# Patient Record
Sex: FEMALE | Race: WHITE | NOT HISPANIC OR LATINO | Employment: FULL TIME | URBAN - METROPOLITAN AREA
[De-identification: names, ages, dates, MRNs, and addresses within clinical notes are randomized per-mention and may not be internally consistent; named-entity substitution may affect disease eponyms.]

---

## 2017-02-10 ENCOUNTER — DOCUMENTATION (OUTPATIENT)
Dept: GENETICS | Facility: HOSPITAL | Age: 42
End: 2017-02-10

## 2017-02-10 NOTE — PROGRESS NOTES
Ellie Hardy, a 41-year-old female, was seen for genetic counseling due to her family history of Charcot Lexy Tooth type I.  Ms. Hardy’s mother has been diagnosed with CMT1A and was found to have a duplication of the PMP22 gene, causative for hereditary CMT1A. Genetic testing results confirming this mutation were available for our review. Ms. Hardy was interested in pursuing testing of the PMP22 gene to assess her risk for CMT1A.  Ms. Hardy’s testing was negative, indicating that she did not inherit the duplication of the PMP22 gene and, therefore, is not at risk to develop CMT1A. These results were discussed with Ms. Hardy via telephone on1/6/17.    FAMILY HISTORY: (See attached pedigree) Ms. Hardy reports that she started developing neuropathy in her arms around 25 years of age.  One of Ms. Hardy’s brothers has been diagnosed with CMT1A and a maternal aunt and uncle have had neuropathy but do not have a clinical diagnosis of CMT1A. Ms. Hardy’s mother has had neuropathy since childhood and was diagnosed with CMT1A in 1995.  Ms. Hadry’s mother previously reported that her brother, mother, and maternal grandfather all had CMT1A.    We have records confirming Ms. 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 We do not have records on the other family members’ diagnoses.    GENETIC COUNSELING: Charcot Lexy Tooth type 1 (CMT1) is a demyelinating peripheral neuropathy characterized by distal muscle weakness and atrophy, sensory loss, and slow nerve conduction velocity. It is usually slowly progressive and often associated with pes cavus deformity and bilateral foot drop. Affected individuals usually become symptomatic between 5 and 25 years of age. Fewer than 5% of individuals become wheelchair dependent and life span is not shortened.    There are several genes that are associated with CMT. The most common cause of CMT1A is mutations in the  PMP22 gene. A mutation in this specific gene can cause CMT. The clinical sensitivity of the genetic testing depends on the patient’s clinical phenotype. Individuals with CMT have a 50% chance of passing a mutation on to offspring. Based on the family history and Ms. Hardy’s mother’s history of a PMP22 duplication, there was a 50% chance for Ms. Hardy to have inherited the PMP22 duplication, causative for hereditary CMT1A.    GENETIC TESTING: Ms. Hardy’s genetic testing was normal, indicating that she did not inherit the PMP22 duplication from her mother. Based on this test result, Ms. Hardy is not at risk to develop CMT1A and her children are also not at risk for this disorder. Genetic testing for the PMP22 duplication remains available for Ms. Hardy’s relatives.    PLAN:  Ms. Hardy’s genetic testing was negative, indicating that she is not at risk for CMT1A. Ms. Hardy reports that she has had some neuropathy, however it is not due to CMT1A and other causes for her neuropathy should be explored. Genetic counseling remains available to Ms. Hardy and her relatives. We encourage her to contact us with any questions or concerns.      Valarie Varela MS, [facility]  Certified Genetic Counselor         Cc: Ellie Mancia MD

## 2017-03-07 ENCOUNTER — OFFICE VISIT (OUTPATIENT)
Dept: CARDIOLOGY | Facility: CLINIC | Age: 42
End: 2017-03-07

## 2017-03-07 VITALS
DIASTOLIC BLOOD PRESSURE: 103 MMHG | OXYGEN SATURATION: 98 % | BODY MASS INDEX: 38.19 KG/M2 | SYSTOLIC BLOOD PRESSURE: 151 MMHG | HEIGHT: 65 IN | HEART RATE: 81 BPM | WEIGHT: 229.2 LBS

## 2017-03-07 DIAGNOSIS — R53.81 MALAISE AND FATIGUE: ICD-10-CM

## 2017-03-07 DIAGNOSIS — Z00.00 HEALTHCARE MAINTENANCE: ICD-10-CM

## 2017-03-07 DIAGNOSIS — R07.2 PRECORDIAL PAIN: Primary | ICD-10-CM

## 2017-03-07 DIAGNOSIS — R53.83 MALAISE AND FATIGUE: ICD-10-CM

## 2017-03-07 DIAGNOSIS — I10 ESSENTIAL HYPERTENSION: ICD-10-CM

## 2017-03-07 DIAGNOSIS — R06.02 SHORTNESS OF BREATH: ICD-10-CM

## 2017-03-07 DIAGNOSIS — R00.2 PALPITATIONS: ICD-10-CM

## 2017-03-07 DIAGNOSIS — R06.83 SNORES: ICD-10-CM

## 2017-03-07 PROCEDURE — 99204 OFFICE O/P NEW MOD 45 MIN: CPT | Performed by: NURSE PRACTITIONER

## 2017-03-07 RX ORDER — NITROGLYCERIN 0.4 MG/1
TABLET SUBLINGUAL
Qty: 30 TABLET | Refills: 5 | Status: SHIPPED | OUTPATIENT
Start: 2017-03-07 | End: 2021-12-16 | Stop reason: SDUPTHER

## 2017-03-07 RX ORDER — CLONIDINE HYDROCHLORIDE 0.1 MG/1
0.1 TABLET ORAL 3 TIMES DAILY PRN
Qty: 30 TABLET | Refills: 5 | Status: SHIPPED | OUTPATIENT
Start: 2017-03-07 | End: 2018-05-18 | Stop reason: SDUPTHER

## 2017-03-07 RX ORDER — CLONIDINE HYDROCHLORIDE 0.1 MG/1
0.1 TABLET ORAL ONCE
Status: COMPLETED | OUTPATIENT
Start: 2017-03-07 | End: 2017-03-07

## 2017-03-07 RX ORDER — HYDROCHLOROTHIAZIDE 12.5 MG/1
12.5 CAPSULE, GELATIN COATED ORAL DAILY
Qty: 30 CAPSULE | Refills: 11 | Status: SHIPPED | OUTPATIENT
Start: 2017-03-07 | End: 2017-08-31 | Stop reason: SINTOL

## 2017-03-07 RX ADMIN — CLONIDINE HYDROCHLORIDE 0.1 MG: 0.1 TABLET ORAL at 09:40

## 2017-03-07 NOTE — PROGRESS NOTES
Subjective   Ellie Hardy is a 41 y.o. female     Chief Complaint   Patient presents with   • Chest Pain     presents as a new patient   • Shortness of Breath       HPI    Problem List:    1. Hypertension  2. Chest Pain  3. Shortness of breath  4. Palpitations   5. Fatigue    Patient is a 41-year-old female who presents today to establish care with her  at her side.  She says she had been of her usual health until about 3 1/2 weeks ago.  She says that she was sleeping and woke up feeling like she needed to pass out.  She made it to the bathroom and felt sick to her stomach.  She says about 2 weeks later she had another episode same thing.  She woke up feeling like she was going to pass out.  Made it to the bathroom, had N/V, severe mid abdomen pain.  She says she went about her day when it subsided, but then had midsternum chest pressure that went in between her shoulder blades and up in the right side of her neck.  She was diaphoretic, short of breath and lightheaded.  She says the chest pressure has slowly gotten better, but she still has it.  She also says that she has been really fatigued since this has happened as well.  She say she has been having what feels like her heart pauses for about 1 mos now.  She denies any dizziness, presyncope or syncope.  She denies any orthopnea or PND.  Since she had her episodes she has had some BLE edema.  She has been short of breath with any activity for the past 2-3 mos.  Her  says she does snore and is fatigued during the day.     Occupation:   Denies smoking, ETOH and illicit drugs  Caffeine cut down to 2 cups of 1/2 and 1/2 coffee/day    Mom 73 alive - no heart problems  Dad 73  - MI x 2, stents and angioplasty; thoracic aorta aneurysm; AAA (cause of death)  Sister 50s alive - HTN  Brother 49 alive - HTN  Paternal grandfather  - heart disease  Paternal uncles several with heart disease   Daughter 22 alive - postural  hypotension  Daughter 24 alive - PPM for 3 year due to Autonomic Dysfunction     Current Outpatient Prescriptions   Medication Sig Dispense Refill   • aspirin 81 MG tablet Take 1 tablet by mouth Daily. 30 tablet 11   • CloNIDine (CATAPRES) 0.1 MG tablet Take 1 tablet by mouth 3 (Three) Times a Day As Needed for high blood pressure (SBP > 160 or DBP > 90). 30 tablet 5   • hydrochlorothiazide (MICROZIDE) 12.5 MG capsule Take 1 capsule by mouth Daily. 30 capsule 11   • nitroglycerin (NITROSTAT) 0.4 MG SL tablet 1 under the tongue as needed for angina, may repeat q5mins for up three doses 30 tablet 5     No current facility-administered medications for this visit.        ALLERGIES    Review of patient's allergies indicates no known allergies.    Past Medical History   Diagnosis Date   • Hypertension        Social History     Social History   • Marital status:      Spouse name: N/A   • Number of children: N/A   • Years of education: N/A     Occupational History   • Not on file.     Social History Main Topics   • Smoking status: Never Smoker   • Smokeless tobacco: Not on file   • Alcohol use No   • Drug use: No   • Sexual activity: Defer     Other Topics Concern   • Not on file     Social History Narrative   • No narrative on file       Family History   Problem Relation Age of Onset   • Charcot-Lexy-Tooth disease Mother    • Heart disease Father        Review of Systems   Constitutional: Positive for fatigue (since episodes ). Negative for diaphoresis.   HENT: Positive for hearing loss and sore throat. Negative for rhinorrhea and sneezing.    Eyes: Negative for visual disturbance.   Respiratory: Positive for shortness of breath (with activity the lats 2-3 mos really bothersome). Negative for chest tightness.    Cardiovascular: Positive for chest pain (midsternum chest pressure to back with pain in right neck. ), palpitations (pause for about 1 mos) and leg swelling (after 2nd episode ).   Gastrointestinal:  "Positive for constipation, nausea (with 2 episodes ) and vomiting (with 2 episodes ).   Endocrine: Negative.    Genitourinary: Positive for difficulty urinating and hematuria (thinks she passed a kidney stone).   Musculoskeletal: Positive for arthralgias, back pain (with CP between shoulder blades and h/o low back pain), joint swelling, myalgias and neck pain (dull pain in right side of neck ).   Skin: Negative.    Allergic/Immunologic: Positive for environmental allergies.   Neurological: Positive for light-headedness. Negative for dizziness and syncope.   Hematological: Negative.    Psychiatric/Behavioral: The patient is nervous/anxious.        Objective   Visit Vitals   • BP (!) 151/103 (BP Location: Left arm, Patient Position: Sitting)   • Pulse 81   • Ht 65\" (165.1 cm)   • Wt 229 lb 3.2 oz (104 kg)   • SpO2 98%   • BMI 38.14 kg/m2     Lab Results (most recent)     None        Physical Exam   Constitutional: She is oriented to person, place, and time. She appears well-developed and well-nourished. She is active and cooperative.   HENT:   Head: Normocephalic.   Eyes: Lids are normal.   Neck: Normal carotid pulses, no hepatojugular reflux and no JVD present. Carotid bruit is not present.   Cardiovascular: Normal rate, regular rhythm and normal heart sounds.    Pulses:       Radial pulses are 2+ on the right side, and 2+ on the left side.        Dorsalis pedis pulses are 2+ on the right side, and 2+ on the left side.        Posterior tibial pulses are 2+ on the right side, and 2+ on the left side.   No edema BLE.    Pulmonary/Chest: Effort normal and breath sounds normal.   Abdominal: Normal appearance.   Neurological: She is alert and oriented to person, place, and time.   Skin: Skin is warm, dry and intact.   Psychiatric: She has a normal mood and affect. Her speech is normal and behavior is normal. Judgment and thought content normal. Cognition and memory are normal.       Procedure   Procedures   "       Assessment/Plan      Diagnosis Plan   1. Precordial pain  Stress Test With Myocardial Perfusion One Day    Adult Transthoracic Echo Complete    nitroglycerin (NITROSTAT) 0.4 MG SL tablet    aspirin 81 MG tablet    Lipid Panel    D-dimer, Quantitative    Stress Test With Myocardial Perfusion One Day    Adult Transthoracic Echo Complete    Lipid Panel    D-dimer, Quantitative   2. Essential hypertension  CloNIDine (CATAPRES) tablet 0.1 mg    Stress Test With Myocardial Perfusion One Day    Adult Transthoracic Echo Complete    hydrochlorothiazide (MICROZIDE) 12.5 MG capsule    CloNIDine (CATAPRES) 0.1 MG tablet    Stress Test With Myocardial Perfusion One Day    Adult Transthoracic Echo Complete   3. Shortness of breath  Stress Test With Myocardial Perfusion One Day    Adult Transthoracic Echo Complete    Pulse Oximetry Device    D-dimer, Quantitative    Stress Test With Myocardial Perfusion One Day    Adult Transthoracic Echo Complete    D-dimer, Quantitative   4. Palpitations  Stress Test With Myocardial Perfusion One Day    Adult Transthoracic Echo Complete    Cardiac Event Monitor    Stress Test With Myocardial Perfusion One Day    Adult Transthoracic Echo Complete    Cardiac Event Monitor   5. Snores  Pulse Oximetry Device   6. Malaise and fatigue  Pulse Oximetry Device   7. Healthcare maintenance  Lipid Panel    Lipid Panel       Return in about 6 weeks (around 4/18/2017).       Patient will have an ischemia work-up, stress and echo.  She will wear an event monitor for 2 weeks.  She will start HCTZ and ASA and monitor her BP.  She will use clonidine PRN for SBP > 160 or DBP > 90.  She will use nitro PRN for chest pain, no resolution she will go to the ER.  She will get a D-Dimer and lipids.  She will wear an overnight pulse ox.

## 2017-03-07 NOTE — PATIENT INSTRUCTIONS
Hypertension  Hypertension, commonly called high blood pressure, is when the force of blood pumping through your arteries is too strong. Your arteries are the blood vessels that carry blood from your heart throughout your body. A blood pressure reading consists of a higher number over a lower number, such as 110/72. The higher number (systolic) is the pressure inside your arteries when your heart pumps. The lower number (diastolic) is the pressure inside your arteries when your heart relaxes. Ideally you want your blood pressure below 120/80.  Hypertension forces your heart to work harder to pump blood. Your arteries may become narrow or stiff. Having untreated or uncontrolled hypertension can cause heart attack, stroke, kidney disease, and other problems.  RISK FACTORS  Some risk factors for high blood pressure are controllable. Others are not.   Risk factors you cannot control include:   · Race. You may be at higher risk if you are .  · Age. Risk increases with age.  · Gender. Men are at higher risk than women before age 45 years. After age 65, women are at higher risk than men.  Risk factors you can control include:  · Not getting enough exercise or physical activity.  · Being overweight.  · Getting too much fat, sugar, calories, or salt in your diet.  · Drinking too much alcohol.  SIGNS AND SYMPTOMS  Hypertension does not usually cause signs or symptoms. Extremely high blood pressure (hypertensive crisis) may cause headache, anxiety, shortness of breath, and nosebleed.  DIAGNOSIS  To check if you have hypertension, your health care provider will measure your blood pressure while you are seated, with your arm held at the level of your heart. It should be measured at least twice using the same arm. Certain conditions can cause a difference in blood pressure between your right and left arms. A blood pressure reading that is higher than normal on one occasion does not mean that you need treatment. If  it is not clear whether you have high blood pressure, you may be asked to return on a different day to have your blood pressure checked again. Or, you may be asked to monitor your blood pressure at home for 1 or more weeks.  TREATMENT  Treating high blood pressure includes making lifestyle changes and possibly taking medicine. Living a healthy lifestyle can help lower high blood pressure. You may need to change some of your habits.  Lifestyle changes may include:  · Following the DASH diet. This diet is high in fruits, vegetables, and whole grains. It is low in salt, red meat, and added sugars.  · Keep your sodium intake below 2,300 mg per day.  · Getting at least 30-45 minutes of aerobic exercise at least 4 times per week.  · Losing weight if necessary.  · Not smoking.  · Limiting alcoholic beverages.  · Learning ways to reduce stress.  Your health care provider may prescribe medicine if lifestyle changes are not enough to get your blood pressure under control, and if one of the following is true:  · You are 18-59 years of age and your systolic blood pressure is above 140.  · You are 60 years of age or older, and your systolic blood pressure is above 150.  · Your diastolic blood pressure is above 90.  · You have diabetes, and your systolic blood pressure is over 140 or your diastolic blood pressure is over 90.  · You have kidney disease and your blood pressure is above 140/90.  · You have heart disease and your blood pressure is above 140/90.  Your personal target blood pressure may vary depending on your medical conditions, your age, and other factors.  HOME CARE INSTRUCTIONS  · Have your blood pressure rechecked as directed by your health care provider.    · Take medicines only as directed by your health care provider. Follow the directions carefully. Blood pressure medicines must be taken as prescribed. The medicine does not work as well when you skip doses. Skipping doses also puts you at risk for  problems.  · Do not smoke.    · Monitor your blood pressure at home as directed by your health care provider.   SEEK MEDICAL CARE IF:   · You think you are having a reaction to medicines taken.  · You have recurrent headaches or feel dizzy.  · You have swelling in your ankles.  · You have trouble with your vision.  SEEK IMMEDIATE MEDICAL CARE IF:  · You develop a severe headache or confusion.  · You have unusual weakness, numbness, or feel faint.  · You have severe chest or abdominal pain.  · You vomit repeatedly.  · You have trouble breathing.  MAKE SURE YOU:   · Understand these instructions.  · Will watch your condition.  · Will get help right away if you are not doing well or get worse.     This information is not intended to replace advice given to you by your health care provider. Make sure you discuss any questions you have with your health care provider.     Document Released: 12/18/2006 Document Revised: 05/03/2016 Document Reviewed: 10/10/2014  [employer] Interactive Patient Education ©2016 [employer]  Palpitations  A palpitation is the feeling that your heartbeat is irregular or is faster than normal. It may feel like your heart is fluttering or skipping a beat. Palpitations are usually not a serious problem. However, in some cases, you may need further medical evaluation.  CAUSES   Palpitations can be caused by:  · Smoking.  · Caffeine or other stimulants, such as diet pills or energy drinks.  · Alcohol.  · Stress and anxiety.  · Strenuous physical activity.  · Fatigue.  · Certain medicines.  · Heart disease, especially if you have a history of irregular heart rhythms (arrhythmias), such as atrial fibrillation, atrial flutter, or supraventricular tachycardia.  · An improperly working pacemaker or defibrillator.  DIAGNOSIS   To find the cause of your palpitations, your health care provider will take your medical history and perform a physical exam. Your health care provider may also have you take a test  called an ambulatory electrocardiogram (ECG). An ECG records your heartbeat patterns over a 24-hour period. You may also have other tests, such as:  · Transthoracic echocardiogram (TTE). During echocardiography, sound waves are used to evaluate how blood flows through your heart.  · Transesophageal echocardiogram (ELVA).  · Cardiac monitoring. This allows your health care provider to monitor your heart rate and rhythm in real time.  · Holter monitor. This is a portable device that records your heartbeat and can help diagnose heart arrhythmias. It allows your health care provider to track your heart activity for several days, if needed.  · Stress tests by exercise or by giving medicine that makes the heart beat faster.  TREATMENT   Treatment of palpitations depends on the cause of your symptoms and can vary greatly. Most cases of palpitations do not require any treatment other than time, relaxation, and monitoring your symptoms. Other causes, such as atrial fibrillation, atrial flutter, or supraventricular tachycardia, usually require further treatment.  HOME CARE INSTRUCTIONS   · Avoid:    Caffeinated coffee, tea, soft drinks, diet pills, and energy drinks.    Chocolate.    Alcohol.  · Stop smoking if you smoke.  · Reduce your stress and anxiety. Things that can help you relax include:    A method of controlling things in your body, such as your heartbeats, with your mind (biofeedback).    Yoga.    Meditation.    Physical activity such as swimming, jogging, or walking.  · Get plenty of rest and sleep.  SEEK MEDICAL CARE IF:   · You continue to have a fast or irregular heartbeat beyond 24 hours.  · Your palpitations occur more often.  SEEK IMMEDIATE MEDICAL CARE IF:  · You have chest pain or shortness of breath.  · You have a severe headache.  · You feel dizzy or you faint.  MAKE SURE YOU:  · Understand these instructions.  · Will watch your condition.  · Will get help right away if you are not doing well or get  worse.     This information is not intended to replace advice given to you by your health care provider. Make sure you discuss any questions you have with your health care provider.     Document Released: 12/15/2001 Document Revised: 12/23/2014 Document Reviewed: 09/01/2016  [employer] Interactive Patient Education ©2016 [employer]  Edema  Edema is an abnormal buildup of fluids in your body tissues. Edema is somewhat dependent on gravity to pull the fluid to the lowest place in your body. That makes the condition more common in the legs and thighs (lower extremities). Painless swelling of the feet and ankles is common and becomes more likely as you get older. It is also common in looser tissues, like around your eyes.   When the affected area is squeezed, the fluid may move out of that spot and leave a dent for a few moments. This dent is called pitting.   CAUSES   There are many possible causes of edema. Eating too much salt and being on your feet or sitting for a long time can cause edema in your legs and ankles. Hot weather may make edema worse. Common medical causes of edema include:  · Heart failure.  · Liver disease.  · Kidney disease.  · Weak blood vessels in your legs.  · Cancer.  · An injury.  · Pregnancy.  · Some medications.  · Obesity.   SYMPTOMS   Edema is usually painless. Your skin may look swollen or shiny.   DIAGNOSIS   Your health care provider may be able to diagnose edema by asking about your medical history and doing a physical exam. You may need to have tests such as X-rays, an electrocardiogram, or blood tests to check for medical conditions that may cause edema.   TREATMENT   Edema treatment depends on the cause. If you have heart, liver, or kidney disease, you need the treatment appropriate for these conditions. General treatment may include:  · Elevation of the affected body part above the level of your heart.  · Compression of the affected body part. Pressure from elastic bandages or  support stockings squeezes the tissues and forces fluid back into the blood vessels. This keeps fluid from entering the tissues.  · Restriction of fluid and salt intake.  · Use of a water pill (diuretic). These medications are appropriate only for some types of edema. They pull fluid out of your body and make you urinate more often. This gets rid of fluid and reduces swelling, but diuretics can have side effects. Only use diuretics as directed by your health care provider.  HOME CARE INSTRUCTIONS   · Keep the affected body part above the level of your heart when you are lying down.    · Do not sit still or stand for prolonged periods.    · Do not put anything directly under your knees when lying down.  · Do not wear constricting clothing or garters on your upper legs.    · Exercise your legs to work the fluid back into your blood vessels. This may help the swelling go down.    · Wear elastic bandages or support stockings to reduce ankle swelling as directed by your health care provider.    · Eat a low-salt diet to reduce fluid if your health care provider recommends it.    · Only take medicines as directed by your health care provider.   SEEK MEDICAL CARE IF:   · Your edema is not responding to treatment.  · You have heart, liver, or kidney disease and notice symptoms of edema.  · You have edema in your legs that does not improve after elevating them.    · You have sudden and unexplained weight gain.  SEEK IMMEDIATE MEDICAL CARE IF:   · You develop shortness of breath or chest pain.    · You cannot breathe when you lie down.  · You develop pain, redness, or warmth in the swollen areas.    · You have heart, liver, or kidney disease and suddenly get edema.  · You have a fever and your symptoms suddenly get worse.  MAKE SURE YOU:   · Understand these instructions.  · Will watch your condition.  · Will get help right away if you are not doing well or get worse.     This information is not intended to replace advice  given to you by your health care provider. Make sure you discuss any questions you have with your health care provider.     Document Released: 12/18/2006 Document Revised: 01/08/2016 Document Reviewed: 10/10/2014  [employer] Interactive Patient Education ©2016 [employer]  Nonspecific Chest Pain   Chest pain can be caused by many different conditions. There is always a chance that your pain could be related to something serious, such as a heart attack or a blood clot in your lungs. Chest pain can also be caused by conditions that are not life-threatening. If you have chest pain, it is very important to follow up with your health care provider.  CAUSES   Chest pain can be caused by:  · Heartburn.  · Pneumonia or bronchitis.  · Anxiety or stress.  · Inflammation around your heart (pericarditis) or lung (pleuritis or pleurisy).  · A blood clot in your lung.  · A collapsed lung (pneumothorax). It can develop suddenly on its own (spontaneous pneumothorax) or from trauma to the chest.  · Shingles infection (varicella-zoster virus).  · Heart attack.  · Damage to the bones, muscles, and cartilage that make up your chest wall. This can include:    Bruised bones due to injury.    Strained muscles or cartilage due to frequent or repeated coughing or overwork.    Fracture to one or more ribs.    Sore cartilage due to inflammation (costochondritis).  RISK FACTORS   Risk factors for chest pain may include:  · Activities that increase your risk for trauma or injury to your chest.  · Respiratory infections or conditions that cause frequent coughing.  · Medical conditions or overeating that can cause heartburn.  · Heart disease or family history of heart disease.  · Conditions or health behaviors that increase your risk of developing a blood clot.  · Having had chicken pox (varicella zoster).  SIGNS AND SYMPTOMS  Chest pain can feel like:  · Burning or tingling on the surface of your chest or deep in your chest.  · Crushing,  pressure, aching, or squeezing pain.  · Dull or sharp pain that is worse when you move, cough, or take a deep breath.  · Pain that is also felt in your back, neck, shoulder, or arm, or pain that spreads to any of these areas.  Your chest pain may come and go, or it may stay constant.  DIAGNOSIS  Lab tests or other studies may be needed to find the cause of your pain. Your health care provider may have you take a test called an ambulatory ECG (electrocardiogram). An ECG records your heartbeat patterns at the time the test is performed. You may also have other tests, such as:  · Transthoracic echocardiogram (TTE). During echocardiography, sound waves are used to create a picture of all of the heart structures and to look at how blood flows through your heart.  · Transesophageal echocardiogram (ELVA). This is a more advanced imaging test that obtains images from inside your body. It allows your health care provider to see your heart in finer detail.  · Cardiac monitoring. This allows your health care provider to monitor your heart rate and rhythm in real time.  · Holter monitor. This is a portable device that records your heartbeat and can help to diagnose abnormal heartbeats. It allows your health care provider to track your heart activity for several days, if needed.  · Stress tests. These can be done through exercise or by taking medicine that makes your heart beat more quickly.  · Blood tests.  · Imaging tests.  TREATMENT   Your treatment depends on what is causing your chest pain. Treatment may include:  · Medicines. These may include:    Acid blockers for heartburn.    Anti-inflammatory medicine.    Pain medicine for inflammatory conditions.    Antibiotic medicine, if an infection is present.    Medicines to dissolve blood clots.    Medicines to treat coronary artery disease.  · Supportive care for conditions that do not require medicines. This may include:    Resting.    Applying heat or cold packs to injured  areas.    Limiting activities until pain decreases.  HOME CARE INSTRUCTIONS  · If you were prescribed an antibiotic medicine, finish it all even if you start to feel better.  · Avoid any activities that bring on chest pain.  · Do not use any tobacco products, including cigarettes, chewing tobacco, or electronic cigarettes. If you need help quitting, ask your health care provider.  · Do not drink alcohol.  · Take medicines only as directed by your health care provider.  · Keep all follow-up visits as directed by your health care provider. This is important. This includes any further testing if your chest pain does not go away.  · If heartburn is the cause for your chest pain, you may be told to keep your head raised (elevated) while sleeping. This reduces the chance that acid will go from your stomach into your esophagus.  · Make lifestyle changes as directed by your health care provider. These may include:    Getting regular exercise. Ask your health care provider to suggest some activities that are safe for you.    Eating a heart-healthy diet. A registered dietitian can help you to learn healthy eating options.    Maintaining a healthy weight.    Managing diabetes, if necessary.    Reducing stress.  SEEK MEDICAL CARE IF:  · Your chest pain does not go away after treatment.  · You have a rash with blisters on your chest.  · You have a fever.  SEEK IMMEDIATE MEDICAL CARE IF:   · Your chest pain is worse.  · You have an increasing cough, or you cough up blood.  · You have severe abdominal pain.  · You have severe weakness.  · You faint.  · You have chills.  · You have sudden, unexplained chest discomfort.  · You have sudden, unexplained discomfort in your arms, back, neck, or jaw.  · You have shortness of breath at any time.  · You suddenly start to sweat, or your skin gets clammy.  · You feel nauseous or you vomit.  · You suddenly feel light-headed or dizzy.  · Your heart begins to beat quickly, or it feels like it  is skipping beats.  These symptoms may represent a serious problem that is an emergency. Do not wait to see if the symptoms will go away. Get medical help right away. Call your local emergency services (911 in the [place] Do not drive yourself to the hospital.     This information is not intended to replace advice given to you by your health care provider. Make sure you discuss any questions you have with your health care provider.     Document Released: 09/27/2006 Document Revised: 01/08/2016 Document Reviewed: 07/24/2015  [employer] Interactive Patient Education ©2016 [employer]

## 2017-03-13 DIAGNOSIS — R79.81 ABNORMAL PULSE OXIMETRY: Primary | ICD-10-CM

## 2017-03-26 ENCOUNTER — OUTSIDE FACILITY SERVICE (OUTPATIENT)
Dept: CARDIOLOGY | Facility: CLINIC | Age: 42
End: 2017-03-26

## 2017-03-26 PROCEDURE — 93272 ECG/REVIEW INTERPRET ONLY: CPT | Performed by: INTERNAL MEDICINE

## 2017-03-28 ENCOUNTER — OUTSIDE FACILITY SERVICE (OUTPATIENT)
Dept: CARDIOLOGY | Facility: CLINIC | Age: 42
End: 2017-03-28

## 2017-03-28 ENCOUNTER — HOSPITAL ENCOUNTER (OUTPATIENT)
Dept: CARDIOLOGY | Facility: HOSPITAL | Age: 42
Discharge: HOME | End: 2017-03-28

## 2017-03-28 LAB
MAXIMAL PREDICTED HEART RATE: 177 BPM
STRESS TARGET HR: 150 BPM

## 2017-03-28 PROCEDURE — 93306 TTE W/DOPPLER COMPLETE: CPT | Performed by: INTERNAL MEDICINE

## 2017-03-28 PROCEDURE — 25010000002 REGADENOSON 0.4 MG/5ML SOLUTION: Performed by: INTERNAL MEDICINE

## 2017-03-28 PROCEDURE — 0 TECHNETIUM SESTAMIBI: Performed by: INTERNAL MEDICINE

## 2017-03-28 PROCEDURE — 93306 TTE W/DOPPLER COMPLETE: CPT

## 2017-03-28 PROCEDURE — A9500 TC99M SESTAMIBI: HCPCS | Performed by: INTERNAL MEDICINE

## 2017-03-28 PROCEDURE — 93017 CV STRESS TEST TRACING ONLY: CPT

## 2017-03-28 PROCEDURE — 78452 HT MUSCLE IMAGE SPECT MULT: CPT | Performed by: INTERNAL MEDICINE

## 2017-03-28 PROCEDURE — 78452 HT MUSCLE IMAGE SPECT MULT: CPT

## 2017-03-28 PROCEDURE — 93018 CV STRESS TEST I&R ONLY: CPT | Performed by: INTERNAL MEDICINE

## 2017-03-28 RX ADMIN — Medication 1 DOSE: at 14:30

## 2017-03-28 RX ADMIN — REGADENOSON 0.4 MG: 0.08 INJECTION, SOLUTION INTRAVENOUS at 14:30

## 2017-04-18 ENCOUNTER — OFFICE VISIT (OUTPATIENT)
Dept: CARDIOLOGY | Facility: CLINIC | Age: 42
End: 2017-04-18

## 2017-04-18 VITALS
OXYGEN SATURATION: 100 % | HEIGHT: 65 IN | WEIGHT: 230.8 LBS | SYSTOLIC BLOOD PRESSURE: 160 MMHG | HEART RATE: 68 BPM | DIASTOLIC BLOOD PRESSURE: 90 MMHG | BODY MASS INDEX: 38.45 KG/M2

## 2017-04-18 DIAGNOSIS — R53.81 MALAISE AND FATIGUE: ICD-10-CM

## 2017-04-18 DIAGNOSIS — R00.2 PALPITATIONS: ICD-10-CM

## 2017-04-18 DIAGNOSIS — R53.83 MALAISE AND FATIGUE: ICD-10-CM

## 2017-04-18 DIAGNOSIS — I10 ESSENTIAL HYPERTENSION: Primary | ICD-10-CM

## 2017-04-18 DIAGNOSIS — R06.02 SHORTNESS OF BREATH: ICD-10-CM

## 2017-04-18 PROCEDURE — 99213 OFFICE O/P EST LOW 20 MIN: CPT | Performed by: NURSE PRACTITIONER

## 2017-04-18 NOTE — PROGRESS NOTES
Subjective   Ellie Hardy is a 41 y.o. female     Chief Complaint   Patient presents with   • Follow-up     presents as a [facility]    Problem List:    1. Hypertension  2. Chest Pain  2.1 Stress Test 3/28/17 - no ischemia; low risk   3. Shortness of breath  3.1 Echo 3/28/17 -  Mild to mod LVH; EF 55-60%; DD I; mild MR  4. Palpitations   4.1 Event Monitor 3/13-3/26/17 - NSR - ST with PVCs/PACs  5. Fatigue    Patient is a 41-year-old female who presents today for a follow-up on test results with her  at her side.  She denies any chest pain, pressure, dizziness, presyncope, syncope, orthopnea, PND or edema.  She says her palpitations had gotten better, but for the past 3-4 days she has noticed them again.  She says her shortness of breath is better, but she still has it with activity.  She did see pulmonary for her abnormal pulse ox.  She is having her sleep study and PFT this week and following up next week.  She says her BP at home is usually -120 and DBP 70-85.     We went over echo, stress, event and labs.     Current Outpatient Prescriptions   Medication Sig Dispense Refill   • aspirin 81 MG tablet Take 1 tablet by mouth Daily. 30 tablet 11   • CloNIDine (CATAPRES) 0.1 MG tablet Take 1 tablet by mouth 3 (Three) Times a Day As Needed for high blood pressure (SBP > 160 or DBP > 90). 30 tablet 5   • hydrochlorothiazide (MICROZIDE) 12.5 MG capsule Take 1 capsule by mouth Daily. 30 capsule 11   • nitroglycerin (NITROSTAT) 0.4 MG SL tablet 1 under the tongue as needed for angina, may repeat q5mins for up three doses 30 tablet 5     No current facility-administered medications for this visit.        ALLERGIES    Review of patient's allergies indicates no known allergies.    Past Medical History:   Diagnosis Date   • Hypertension        Social History     Social History   • Marital status:      Spouse name: N/A   • Number of children: N/A   • Years of education: N/A     Occupational  "History   • Not on file.     Social History Main Topics   • Smoking status: Never Smoker   • Smokeless tobacco: Not on file   • Alcohol use No   • Drug use: No   • Sexual activity: Defer     Other Topics Concern   • Not on file     Social History Narrative       Family History   Problem Relation Age of Onset   • Charcot-Lexy-Tooth disease Mother    • Heart disease Father        Review of Systems   Constitutional: Positive for fatigue. Negative for diaphoresis.   HENT: Negative for rhinorrhea and sneezing.    Eyes: Negative for visual disturbance.   Respiratory: Positive for shortness of breath (better, but still has some shortness of breath with activity ). Negative for chest tightness.    Cardiovascular: Positive for palpitations (subsided the weeks after she saw me last, but the past 3-4 days has noticed it again  ). Negative for chest pain and leg swelling.   Gastrointestinal: Negative for nausea and vomiting.   Endocrine: Negative.    Genitourinary: Negative for difficulty urinating.   Musculoskeletal: Negative for arthralgias, back pain and neck pain.   Skin: Negative.    Allergic/Immunologic: Negative.    Neurological: Positive for headaches (wakes up with headache). Negative for dizziness, syncope and light-headedness.   Hematological: Negative.    Psychiatric/Behavioral: The patient is nervous/anxious.        Objective   /90 (BP Location: Left arm, Patient Position: Sitting)  Pulse 68  Ht 65\" (165.1 cm)  Wt 230 lb 12.8 oz (105 kg)  SpO2 100%  BMI 38.41 kg/m2  Lab Results (most recent)     None        Physical Exam   Constitutional: She is oriented to person, place, and time. Vital signs are normal. She appears well-developed and well-nourished. She is active and cooperative.   HENT:   Head: Normocephalic.   Eyes: Lids are normal.   Neck: Normal carotid pulses, no hepatojugular reflux and no JVD present. Carotid bruit is not present.   Cardiovascular: Normal rate, regular rhythm and normal heart " sounds.    Pulses:       Radial pulses are 2+ on the right side, and 2+ on the left side.        Dorsalis pedis pulses are 2+ on the right side, and 2+ on the left side.        Posterior tibial pulses are 2+ on the right side, and 2+ on the left side.   No edema BLE.    Pulmonary/Chest: Effort normal and breath sounds normal.   Abdominal: Normal appearance.   Neurological: She is alert and oriented to person, place, and time.   Skin: Skin is warm, dry and intact.   Psychiatric: She has a normal mood and affect. Her speech is normal and behavior is normal. Judgment and thought content normal. Cognition and memory are normal.       Procedure   Procedures         Assessment/Plan      Diagnosis Plan   1. Essential hypertension     2. Malaise and fatigue     3. Palpitations     4. Shortness of breath         Return in about 6 weeks (around 5/30/2017).    Patient will keep a record of her BP/HR twice a day and bring to her follow-up in 6 weeks.  She will continue her medication regimen.  We will see if her palpitations improve if she is determined to have KEVYN and uses a device.  She will follow-up in 6 weeks or sooner if any changes.

## 2017-04-18 NOTE — PATIENT INSTRUCTIONS
Sleep Apnea  Sleep apnea is a condition in which breathing pauses or becomes shallow during sleep. Episodes of sleep apnea usually last 10 seconds or longer, and they may occur as many as 20 times an hour. Sleep apnea disrupts your sleep and keeps your body from getting the rest that it needs. This condition can increase your risk of certain health problems, including:  · Heart attack.  · Stroke.  · Obesity.  · Diabetes.  · Heart failure.  · Irregular heartbeat.  There are three kinds of sleep apnea:  · Obstructive sleep apnea. This kind is caused by a blocked or collapsed airway.  · Central sleep apnea. This kind happens when the part of the brain that controls breathing does not send the correct signals to the muscles that control breathing.  · Mixed sleep apnea. This is a combination of obstructive and central sleep apnea.  CAUSES  The most common cause of this condition is a collapsed or blocked airway. An airway can collapse or become blocked if:  · Your throat muscles are abnormally relaxed.  · Your tongue and tonsils are larger than normal.  · You are overweight.  · Your airway is smaller than normal.  RISK FACTORS  This condition is more likely to develop in people who:  · Are overweight.  · Smoke.  · Have a smaller than normal airway.  · Are elderly.  · Are male.  · Drink alcohol.  · Take sedatives or tranquilizers.  · Have a family history of sleep apnea.  SYMPTOMS  Symptoms of this condition include:  · Trouble staying asleep.  · Daytime sleepiness and tiredness.  · Irritability.  · Loud snoring.  · Morning headaches.  · Trouble concentrating.  · Forgetfulness.  · Decreased interest in sex.  · Unexplained sleepiness.  · Mood swings.  · Personality changes.  · Feelings of depression.  · Waking up often during the night to urinate.  · Dry mouth.  · Sore throat.  DIAGNOSIS  This condition may be diagnosed with:  · A medical history.  · A physical exam.  · A series of tests that are done while you are  sleeping (sleep study). These tests are usually done in a sleep lab, but they may also be done at home.  TREATMENT  Treatment for this condition aims to restore normal breathing and to ease symptoms during sleep. It may involve managing health issues that can affect breathing, such as high blood pressure or obesity. Treatment may include:  · Sleeping on your side.  · Using a decongestant if you have nasal congestion.  · Avoiding the use of depressants, including alcohol, sedatives, and narcotics.  · Losing weight if you are overweight.  · Making changes to your diet.  · Quitting smoking.  · Using a device to open your airway while you sleep, such as:    An oral appliance. This is a custom-made mouthpiece that shifts your lower jaw forward.    A continuous positive airway pressure (CPAP) device. This device delivers oxygen to your airway through a mask.    A nasal expiratory positive airway pressure (EPAP) device. This device has valves that you put into each nostril.    A bi-level positive airway pressure (BPAP) device. This device delivers oxygen to your airway through a mask.  · Surgery if other treatments do not work. During surgery, excess tissue is removed to create a wider airway.  It is important to get treatment for sleep apnea. Without treatment, this condition can lead to:  · High blood pressure.  · Coronary artery disease.  · (Men) An inability to achieve or maintain an erection (impotence).  · Reduced thinking abilities.  HOME CARE INSTRUCTIONS  · Make any lifestyle changes that your health care provider recommends.  · Eat a healthy, well-balanced diet.  · Take over-the-counter and prescription medicines only as told by your health care provider.  · Avoid using depressants, including alcohol, sedatives, and narcotics.  · Take steps to lose weight if you are overweight.  · If you were given a device to open your airway while you sleep, use it only as told by your health care provider.  · Do not use any  tobacco products, such as cigarettes, chewing tobacco, and e-cigarettes. If you need help quitting, ask your health care provider.  · Keep all follow-up visits as told by your health care provider. This is important.  SEEK MEDICAL CARE IF:  · The device that you received to open your airway during sleep is uncomfortable or does not seem to be working.  · Your symptoms do not improve.  · Your symptoms get worse.  SEEK IMMEDIATE MEDICAL CARE IF:  · You develop chest pain.  · You develop shortness of breath.  · You develop discomfort in your back, arms, or stomach.  · You have trouble speaking.  · You have weakness on one side of your body.  · You have drooping in your face.  These symptoms may represent a serious problem that is an emergency. Do not wait to see if the symptoms will go away. Get medical help right away. Call your local emergency services (911 in the [place] Do not drive yourself to the hospital.     This information is not intended to replace advice given to you by your health care provider. Make sure you discuss any questions you have with your health care provider.     Document Released: 12/08/2003 Document Revised: 01/13/2017 Document Reviewed: 09/26/2016  [employer] Interactive Patient Education ©2016 [employer]  Premature Atrial Contraction  Premature atrial contractions (PACs) happen when your heart beats before it has had time to fill with blood. Your heart then has to pause until it can fill with blood for the next beat. This causes the next beat to be more forceful. PACs are also called skipped heartbeats because it may feel like your heart stops for a second.   Your heart has four chambers. There are two upper chambers (atria) and two lower chambers (ventricles). All the chambers need to work together to pump blood properly. Electrical signals spread across your heart and make all the chambers beat together. The signal to beat starts in your atria. If the atria fire a bit early, you may have  a PAC.   CAUSES   The cause of a PAC is often unknown. PACs are sometimes caused by heart disease or injury.  RISK FACTORS  PACs are more common in children and older people. Other risk factors that may trigger PACs include:  · Caffeine.  · Stress.  · Fatigue.  · Alcohol.  · Smoking.  · Stimulant drugs. These may be prescription or illegal drugs.  · Heart disease.  SIGNS AND SYMPTOMS  PACs are very common, especially in children and people 50 years and older. PACs do not cause dizziness, shortness of breath, or chest pain. The only symptom of a PAC is the sensation of a skipped or fluttering heartbeat.   DIAGNOSIS   Your health care provider can diagnose PAC based on the description of your symptom. Your health care provider may also:  · Perform a physical exam to listen to your heart. Your heart may sound normal during this exam.  · Perform tests to rule out other conditions. These tests may include an electrical tracing of your heart called electrocardiogram (ECG). You may need to wear a portable ECG machine (Holter monitor) that records your heart for 24 hours or more.  TREATMENT   In most cases, PACs do not need to be treated. If you have frequent PACs that are caused by heart disease, you may be treated for the underlying condition.  HOME CARE INSTRUCTIONS  · Do not use any tobacco products, including cigarettes, chewing tobacco, or electronic cigarettes. If you need help quitting, ask your health care provider.  · Limit alcohol intake to no more than 1 drink per day for nonpregnant women and 2 drinks per day for men. One drink equals 12 ounces of beer, 5 ounces of wine, or 1½ ounces of hard liquor.  · Limit the amount of caffeine you take in.  · Do not use illegal drugs.  · Get at least 8 hours of sleep every night.  · Find healthy ways to manage stress.  · Get regular exercise. Ask your health care provider to suggest some activities that are safe for you.  SEEK MEDICAL CARE IF:  · You feel your heart  skipping beats often (more than once a day).  · Your heart skips beats and you feel dizzy, lightheaded, or very tired.  SEEK IMMEDIATE MEDICAL CARE IF:   · You have chest pain.  · You have trouble breathing.     This information is not intended to replace advice given to you by your health care provider. Make sure you discuss any questions you have with your health care provider.     Document Released: 08/21/2015 Document Revised: 05/03/2016 Document Reviewed: 08/21/2015  [employer] Interactive Patient Education ©2016 [employer]

## 2017-05-31 ENCOUNTER — OFFICE VISIT (OUTPATIENT)
Dept: CARDIOLOGY | Facility: CLINIC | Age: 42
End: 2017-05-31

## 2017-05-31 VITALS
DIASTOLIC BLOOD PRESSURE: 94 MMHG | SYSTOLIC BLOOD PRESSURE: 146 MMHG | WEIGHT: 232.8 LBS | HEIGHT: 65 IN | OXYGEN SATURATION: 98 % | BODY MASS INDEX: 38.79 KG/M2 | HEART RATE: 92 BPM

## 2017-05-31 DIAGNOSIS — R00.2 PALPITATIONS: ICD-10-CM

## 2017-05-31 DIAGNOSIS — I10 ESSENTIAL HYPERTENSION: Primary | ICD-10-CM

## 2017-05-31 DIAGNOSIS — R60.9 PERIPHERAL EDEMA: ICD-10-CM

## 2017-05-31 PROCEDURE — 99213 OFFICE O/P EST LOW 20 MIN: CPT | Performed by: NURSE PRACTITIONER

## 2017-05-31 RX ORDER — FUROSEMIDE 20 MG/1
20 TABLET ORAL DAILY PRN
Qty: 30 TABLET | Refills: 3 | Status: SHIPPED | OUTPATIENT
Start: 2017-05-31 | End: 2017-10-17 | Stop reason: SDUPTHER

## 2017-08-31 ENCOUNTER — OFFICE VISIT (OUTPATIENT)
Dept: CARDIOLOGY | Facility: CLINIC | Age: 42
End: 2017-08-31

## 2017-08-31 VITALS
HEIGHT: 65 IN | WEIGHT: 234.4 LBS | DIASTOLIC BLOOD PRESSURE: 99 MMHG | SYSTOLIC BLOOD PRESSURE: 139 MMHG | HEART RATE: 85 BPM | OXYGEN SATURATION: 97 % | BODY MASS INDEX: 39.05 KG/M2

## 2017-08-31 DIAGNOSIS — I10 ESSENTIAL HYPERTENSION: Primary | ICD-10-CM

## 2017-08-31 DIAGNOSIS — R60.9 PERIPHERAL EDEMA: ICD-10-CM

## 2017-08-31 DIAGNOSIS — I49.3 PVC (PREMATURE VENTRICULAR CONTRACTION): ICD-10-CM

## 2017-08-31 DIAGNOSIS — R00.2 HEART PALPITATIONS: ICD-10-CM

## 2017-08-31 DIAGNOSIS — R06.02 SHORTNESS OF BREATH: ICD-10-CM

## 2017-08-31 PROCEDURE — 93000 ELECTROCARDIOGRAM COMPLETE: CPT | Performed by: NURSE PRACTITIONER

## 2017-08-31 PROCEDURE — 99214 OFFICE O/P EST MOD 30 MIN: CPT | Performed by: NURSE PRACTITIONER

## 2017-10-17 ENCOUNTER — OFFICE VISIT (OUTPATIENT)
Dept: CARDIOLOGY | Facility: CLINIC | Age: 42
End: 2017-10-17

## 2017-10-17 VITALS
HEART RATE: 72 BPM | HEIGHT: 65 IN | DIASTOLIC BLOOD PRESSURE: 103 MMHG | OXYGEN SATURATION: 100 % | BODY MASS INDEX: 39.09 KG/M2 | SYSTOLIC BLOOD PRESSURE: 147 MMHG | WEIGHT: 234.6 LBS

## 2017-10-17 DIAGNOSIS — R60.9 PERIPHERAL EDEMA: ICD-10-CM

## 2017-10-17 DIAGNOSIS — I10 ESSENTIAL HYPERTENSION: Primary | ICD-10-CM

## 2017-10-17 DIAGNOSIS — R00.2 PALPITATIONS: ICD-10-CM

## 2017-10-17 DIAGNOSIS — R06.02 SHORTNESS OF BREATH: ICD-10-CM

## 2017-10-17 PROCEDURE — 99214 OFFICE O/P EST MOD 30 MIN: CPT | Performed by: NURSE PRACTITIONER

## 2017-10-17 RX ORDER — POTASSIUM CHLORIDE 750 MG/1
TABLET, FILM COATED, EXTENDED RELEASE ORAL
Qty: 15 TABLET | Refills: 5 | Status: SHIPPED | OUTPATIENT
Start: 2017-10-17 | End: 2021-12-16 | Stop reason: SDUPTHER

## 2017-10-17 RX ORDER — FUROSEMIDE 20 MG/1
TABLET ORAL
Qty: 30 TABLET | Refills: 5 | Status: SHIPPED | OUTPATIENT
Start: 2017-10-17 | End: 2018-01-31 | Stop reason: SDUPTHER

## 2017-10-17 RX ORDER — DILTIAZEM HYDROCHLORIDE 120 MG/1
120 CAPSULE, EXTENDED RELEASE ORAL DAILY
Qty: 30 CAPSULE | Refills: 11 | Status: SHIPPED | OUTPATIENT
Start: 2017-10-17 | End: 2018-01-31 | Stop reason: SDUPTHER

## 2017-10-17 NOTE — PATIENT INSTRUCTIONS
Edema  Edema is an abnormal buildup of fluids in your body tissues. Edema is somewhat dependent on gravity to pull the fluid to the lowest place in your body. That makes the condition more common in the legs and thighs (lower extremities). Painless swelling of the feet and ankles is common and becomes more likely as you get older. It is also common in looser tissues, like around your eyes.   When the affected area is squeezed, the fluid may move out of that spot and leave a dent for a few moments. This dent is called pitting.   CAUSES   There are many possible causes of edema. Eating too much salt and being on your feet or sitting for a long time can cause edema in your legs and ankles. Hot weather may make edema worse. Common medical causes of edema include:  · Heart failure.  · Liver disease.  · Kidney disease.  · Weak blood vessels in your legs.  · Cancer.  · An injury.  · Pregnancy.  · Some medications.  · Obesity.   SYMPTOMS   Edema is usually painless. Your skin may look swollen or shiny.   DIAGNOSIS   Your health care provider may be able to diagnose edema by asking about your medical history and doing a physical exam. You may need to have tests such as X-rays, an electrocardiogram, or blood tests to check for medical conditions that may cause edema.   TREATMENT   Edema treatment depends on the cause. If you have heart, liver, or kidney disease, you need the treatment appropriate for these conditions. General treatment may include:  · Elevation of the affected body part above the level of your heart.  · Compression of the affected body part. Pressure from elastic bandages or support stockings squeezes the tissues and forces fluid back into the blood vessels. This keeps fluid from entering the tissues.  · Restriction of fluid and salt intake.  · Use of a water pill (diuretic). These medications are appropriate only for some types of edema. They pull fluid out of your body and make you urinate more often. This  gets rid of fluid and reduces swelling, but diuretics can have side effects. Only use diuretics as directed by your health care provider.  HOME CARE INSTRUCTIONS   · Keep the affected body part above the level of your heart when you are lying down.    · Do not sit still or stand for prolonged periods.    · Do not put anything directly under your knees when lying down.  · Do not wear constricting clothing or garters on your upper legs.    · Exercise your legs to work the fluid back into your blood vessels. This may help the swelling go down.    · Wear elastic bandages or support stockings to reduce ankle swelling as directed by your health care provider.    · Eat a low-salt diet to reduce fluid if your health care provider recommends it.    · Only take medicines as directed by your health care provider.   SEEK MEDICAL CARE IF:   · Your edema is not responding to treatment.  · You have heart, liver, or kidney disease and notice symptoms of edema.  · You have edema in your legs that does not improve after elevating them.    · You have sudden and unexplained weight gain.  SEEK IMMEDIATE MEDICAL CARE IF:   · You develop shortness of breath or chest pain.    · You cannot breathe when you lie down.  · You develop pain, redness, or warmth in the swollen areas.    · You have heart, liver, or kidney disease and suddenly get edema.  · You have a fever and your symptoms suddenly get worse.  MAKE SURE YOU:   · Understand these instructions.  · Will watch your condition.  · Will get help right away if you are not doing well or get worse.     This information is not intended to replace advice given to you by your health care provider. Make sure you discuss any questions you have with your health care provider.     Document Released: 12/18/2006 Document Revised: 04/10/2017 Document Reviewed: 10/10/2014  [employer] Interactive Patient Education ©2017 [employer]  Hypertension  Hypertension, commonly called high blood pressure, is  when the force of blood pumping through your arteries is too strong. Your arteries are the blood vessels that carry blood from your heart throughout your body. A blood pressure reading consists of a higher number over a lower number, such as 110/72. The higher number (systolic) is the pressure inside your arteries when your heart pumps. The lower number (diastolic) is the pressure inside your arteries when your heart relaxes. Ideally you want your blood pressure below 120/80.  Hypertension forces your heart to work harder to pump blood. Your arteries may become narrow or stiff. Having untreated or uncontrolled hypertension can cause heart attack, stroke, kidney disease, and other problems.  RISK FACTORS  Some risk factors for high blood pressure are controllable. Others are not.   Risk factors you cannot control include:   · Race. You may be at higher risk if you are .  · Age. Risk increases with age.  · Gender. Men are at higher risk than women before age 45 years. After age 65, women are at higher risk than men.  Risk factors you can control include:  · Not getting enough exercise or physical activity.  · Being overweight.  · Getting too much fat, sugar, calories, or salt in your diet.  · Drinking too much alcohol.  SIGNS AND SYMPTOMS  Hypertension does not usually cause signs or symptoms. Extremely high blood pressure (hypertensive crisis) may cause headache, anxiety, shortness of breath, and nosebleed.  DIAGNOSIS  To check if you have hypertension, your health care provider will measure your blood pressure while you are seated, with your arm held at the level of your heart. It should be measured at least twice using the same arm. Certain conditions can cause a difference in blood pressure between your right and left arms. A blood pressure reading that is higher than normal on one occasion does not mean that you need treatment. If it is not clear whether you have high blood pressure, you may be  asked to return on a different day to have your blood pressure checked again. Or, you may be asked to monitor your blood pressure at home for 1 or more weeks.  TREATMENT  Treating high blood pressure includes making lifestyle changes and possibly taking medicine. Living a healthy lifestyle can help lower high blood pressure. You may need to change some of your habits.  Lifestyle changes may include:  · Following the DASH diet. This diet is high in fruits, vegetables, and whole grains. It is low in salt, red meat, and added sugars.  · Keep your sodium intake below 2,300 mg per day.  · Getting at least 30-45 minutes of aerobic exercise at least 4 times per week.  · Losing weight if necessary.  · Not smoking.  · Limiting alcoholic beverages.  · Learning ways to reduce stress.  Your health care provider may prescribe medicine if lifestyle changes are not enough to get your blood pressure under control, and if one of the following is true:  · You are 18-59 years of age and your systolic blood pressure is above 140.  · You are 60 years of age or older, and your systolic blood pressure is above 150.  · Your diastolic blood pressure is above 90.  · You have diabetes, and your systolic blood pressure is over 140 or your diastolic blood pressure is over 90.  · You have kidney disease and your blood pressure is above 140/90.  · You have heart disease and your blood pressure is above 140/90.  Your personal target blood pressure may vary depending on your medical conditions, your age, and other factors.  HOME CARE INSTRUCTIONS  · Have your blood pressure rechecked as directed by your health care provider.    · Take medicines only as directed by your health care provider. Follow the directions carefully. Blood pressure medicines must be taken as prescribed. The medicine does not work as well when you skip doses. Skipping doses also puts you at risk for problems.  · Do not smoke.    · Monitor your blood pressure at home as  directed by your health care provider.   SEEK MEDICAL CARE IF:   · You think you are having a reaction to medicines taken.  · You have recurrent headaches or feel dizzy.  · You have swelling in your ankles.  · You have trouble with your vision.  SEEK IMMEDIATE MEDICAL CARE IF:  · You develop a severe headache or confusion.  · You have unusual weakness, numbness, or feel faint.  · You have severe chest or abdominal pain.  · You vomit repeatedly.  · You have trouble breathing.  MAKE SURE YOU:   · Understand these instructions.  · Will watch your condition.  · Will get help right away if you are not doing well or get worse.     This information is not intended to replace advice given to you by your health care provider. Make sure you discuss any questions you have with your health care provider.     Document Released: 12/18/2006 Document Revised: 05/03/2016 Document Reviewed: 10/10/2014  [employer] Interactive Patient Education ©2017 [employer]

## 2017-10-17 NOTE — PROGRESS NOTES
Subjective   Ellie Hardy is a 42 y.o. female     Chief Complaint   Patient presents with   • Hypertension     presents as a follow up       HPI    Problem List:    1. Hypertension  2. Chest Pain  2.1 Stress Test 3/28/17 - no ischemia; low risk   3. Shortness of breath  3.1 Echo 3/28/17 -  Mild to mod LVH; EF 55-60%; DD I; mild MR  4. Palpitations   4.1 Event Monitor 3/13-3/26/17 - NSR - ST with PVCs/PACs  5. Fatigue    Patient is a 42-year-old female who presents today for follow-up.  She denies any chest pain or pressure.  She says her palpitations are much better.  She says she will get dizzy with position changes but only at times.  She denies any presyncope, CP, orthopnea or PND.  She says that her swelling has been a little more here lately as well as her blood pressure has been a lot more elevated.  She felt really good when she started diltiazem however again her shortness of breath has increased her lately as well as her blood pressures become more elevated.    Current Outpatient Prescriptions   Medication Sig Dispense Refill   • aspirin 81 MG tablet Take 1 tablet by mouth Daily. 30 tablet 11   • CloNIDine (CATAPRES) 0.1 MG tablet Take 1 tablet by mouth 3 (Three) Times a Day As Needed for high blood pressure (SBP > 160 or DBP > 90). 30 tablet 5   • furosemide (LASIX) 20 MG tablet Take lasix daily; taken 2 tabs of lasix for 2 days then return to normal dose 30 tablet 5   • nitroglycerin (NITROSTAT) 0.4 MG SL tablet 1 under the tongue as needed for angina, may repeat q5mins for up three doses 30 tablet 5   • diltiazem XR (DILACOR XR) 120 MG 24 hr capsule Take 1 capsule by mouth Daily. 30 capsule 11   • potassium chloride (K-DUR) 10 MEQ CR tablet Take 1 tablet when take 2 tablets of lasix only 15 tablet 5     No current facility-administered medications for this visit.        ALLERGIES    Review of patient's allergies indicates no known allergies.    Past Medical History:   Diagnosis Date   • Hypertension   "  • Sleep apnea     c-pap, followed by Dr. Loera       Social History     Social History   • Marital status:      Spouse name: N/A   • Number of children: N/A   • Years of education: N/A     Occupational History   • Not on file.     Social History Main Topics   • Smoking status: Never Smoker   • Smokeless tobacco: Never Used   • Alcohol use No   • Drug use: No   • Sexual activity: Defer     Other Topics Concern   • Not on file     Social History Narrative       Family History   Problem Relation Age of Onset   • Charcot-Lexy-Tooth disease Mother    • Heart disease Father        Review of Systems   Constitutional: Positive for fatigue (much better ). Negative for diaphoresis.   HENT: Negative for rhinorrhea and sneezing.    Eyes: Negative for visual disturbance.   Respiratory: Positive for shortness of breath (had gotten better, but been a little worse the last few days; had been better at first then BP started going back up ). Negative for chest tightness.    Cardiovascular: Positive for palpitations (much better ) and leg swelling (still some - water pill helps ). Negative for chest pain.   Gastrointestinal: Negative for nausea and vomiting.   Endocrine: Negative.    Genitourinary: Negative for difficulty urinating.   Musculoskeletal: Positive for arthralgias, back pain and neck pain.   Skin: Negative.    Allergic/Immunologic: Positive for environmental allergies.   Neurological: Positive for dizziness (little bit with position changes ). Negative for syncope and light-headedness.   Hematological: Bruises/bleeds easily (bruises easily).   Psychiatric/Behavioral: Negative.        Objective   BP (!) 147/103 (BP Location: Left arm, Patient Position: Sitting)  Pulse 72  Ht 65\" (165.1 cm)  Wt 234 lb 9.6 oz (106 kg)  SpO2 100%  BMI 39.04 kg/m2  Vitals:    10/17/17 1537   BP: (!) 147/103   BP Location: Left arm   Patient Position: Sitting   Pulse: 72   SpO2: 100%   Weight: 234 lb 9.6 oz (106 kg)   Height: 65\" " (165.1 cm)      Lab Results (most recent)     None        Physical Exam   Constitutional: She is oriented to person, place, and time. She appears well-developed and well-nourished. She is active and cooperative.   HENT:   Head: Normocephalic.   Eyes: Lids are normal.   Neck: Normal carotid pulses, no hepatojugular reflux and no JVD present. Carotid bruit is not present.   Cardiovascular: Normal rate, regular rhythm and normal heart sounds.    Pulses:       Radial pulses are 2+ on the right side, and 2+ on the left side.        Dorsalis pedis pulses are 2+ on the right side, and 2+ on the left side.        Posterior tibial pulses are 2+ on the right side, and 2+ on the left side.   1+ edema BLE.    Pulmonary/Chest: Effort normal and breath sounds normal.   Abdominal: Normal appearance and bowel sounds are normal.   Neurological: She is alert and oriented to person, place, and time.   Skin: Skin is warm, dry and intact.   Psychiatric: She has a normal mood and affect. Her speech is normal and behavior is normal. Judgment and thought content normal. Cognition and memory are normal.       Procedure   Procedures         Assessment/Plan      Diagnosis Plan   1. Essential hypertension  diltiazem XR (DILACOR XR) 120 MG 24 hr capsule   2. Palpitations  diltiazem XR (DILACOR XR) 120 MG 24 hr capsule   3. Shortness of breath     4. Peripheral edema  furosemide (LASIX) 20 MG tablet    potassium chloride (K-DUR) 10 MEQ CR tablet       Return in about 3 months (around 1/17/2018).       hypertension/palpitations-patient will start diltiazem 30 twice a day and start diltiazem 120 once a day.  Shortness of breath/peripheral edema-patient will take 40 mg of Lasix for 2 days with 10 mEq of potassium.  After those today she will then change her diltiazem dosing.  She will monitor her blood pressure and heart rate.  She will call in a week and let me know how she is tolerating medication changes.  She will follow-up in 3 months or  sooner if any changes.

## 2018-01-17 ENCOUNTER — OFFICE VISIT (OUTPATIENT)
Dept: CARDIOLOGY | Facility: CLINIC | Age: 43
End: 2018-01-17

## 2018-01-17 VITALS
DIASTOLIC BLOOD PRESSURE: 95 MMHG | HEIGHT: 65 IN | OXYGEN SATURATION: 98 % | HEART RATE: 84 BPM | SYSTOLIC BLOOD PRESSURE: 151 MMHG | BODY MASS INDEX: 39.45 KG/M2 | WEIGHT: 236.8 LBS

## 2018-01-17 DIAGNOSIS — R00.2 PALPITATIONS: ICD-10-CM

## 2018-01-17 DIAGNOSIS — R06.02 SHORTNESS OF BREATH: ICD-10-CM

## 2018-01-17 DIAGNOSIS — R07.9 CHEST PAIN, UNSPECIFIED TYPE: Primary | ICD-10-CM

## 2018-01-17 PROCEDURE — 99214 OFFICE O/P EST MOD 30 MIN: CPT | Performed by: PHYSICIAN ASSISTANT

## 2018-01-17 RX ORDER — ISOSORBIDE MONONITRATE 30 MG/1
30 TABLET, EXTENDED RELEASE ORAL EVERY MORNING
Qty: 30 TABLET | Refills: 11 | Status: SHIPPED | OUTPATIENT
Start: 2018-01-17 | End: 2018-03-15 | Stop reason: ALTCHOICE

## 2018-01-17 NOTE — PROGRESS NOTES
Problem list     Subjective   Ellie Hardy is a 42 y.o. female     Chief Complaint   Patient presents with   • Hypertension     Here for 3 mo. f/u   • Palpitations       HPI    Problem List:     1. Hypertension  2. Chest Pain  2.1 Stress Test 3/28/17 - no ischemia; low risk   3. Shortness of breath  3.1 Echo 3/28/17 -  Mild to mod LVH; EF 55-60%; DD I; mild MR  4. Palpitations   4.1 Event Monitor 3/13-3/26/17 - NSR - ST with PVCs/PACs  5. Fatigue     Patient is a 42-year-old female that presents back for follow-up.  Patient had done better since last office visit in regards to edema and palpitations.  Diltiazem is controlling palpitations and Lasix therapy's help in regards to lower extremity edema.    Patient has been experiencing chest pain unfortunately.  She describes this occurring within the last 2 weeks.  It is not precipitated by activity but rather experienced at random.  She will develop pressure heaviness which is associated with left arm pain as well.  He will last for several minutes before subsiding.  She has been concerned because of the recent start of chest pain.    Shortness of breath is mild at baseline but nothing that has been progressive.  Denies PND orthopnea.  Her palpitations have improved as mentioned above.  No dizziness presyncope or syncope.      Outpatient Encounter Prescriptions as of 1/17/2018   Medication Sig Dispense Refill   • aspirin 81 MG tablet Take 1 tablet by mouth Daily. 30 tablet 11   • diltiazem XR (DILACOR XR) 120 MG 24 hr capsule Take 1 capsule by mouth Daily. 30 capsule 11   • furosemide (LASIX) 20 MG tablet Take lasix daily; taken 2 tabs of lasix for 2 days then return to normal dose 30 tablet 5   • potassium chloride (K-DUR) 10 MEQ CR tablet Take 1 tablet when take 2 tablets of lasix only 15 tablet 5   • CloNIDine (CATAPRES) 0.1 MG tablet Take 1 tablet by mouth 3 (Three) Times a Day As Needed for high blood pressure (SBP > 160 or DBP > 90). 30 tablet 5   •  "isosorbide mononitrate (IMDUR) 30 MG 24 hr tablet Take 1 tablet by mouth Every Morning. 30 tablet 11   • nitroglycerin (NITROSTAT) 0.4 MG SL tablet 1 under the tongue as needed for angina, may repeat q5mins for up three doses 30 tablet 5     No facility-administered encounter medications on file as of 1/17/2018.        Review of patient's allergies indicates no known allergies.    Past Medical History:   Diagnosis Date   • Hypertension    • Sleep apnea     c-pap, followed by Dr. Loera       Social History     Social History   • Marital status:      Spouse name: N/A   • Number of children: N/A   • Years of education: N/A     Occupational History   • Not on file.     Social History Main Topics   • Smoking status: Never Smoker   • Smokeless tobacco: Never Used   • Alcohol use No   • Drug use: No   • Sexual activity: Defer     Other Topics Concern   • Not on file     Social History Narrative       Family History   Problem Relation Age of Onset   • Charcot-Lexy-Tooth disease Mother    • Heart disease Father        Review of Systems   Constitutional: Positive for fatigue.   HENT: Negative.    Eyes: Negative.    Respiratory: Positive for shortness of breath (occas.).    Cardiovascular: Positive for chest pain (radiates through center back between shoulders) and palpitations. Negative for leg swelling.   Gastrointestinal: Negative.    Endocrine: Negative.    Genitourinary: Negative.    Musculoskeletal: Positive for myalgias.   Skin: Negative.    Allergic/Immunologic: Negative.    Neurological: Positive for numbness (left hand, \"band\" around left upper/mid arm).   Hematological: Negative.    Psychiatric/Behavioral: Negative.        Objective   Vitals:    01/17/18 1450   BP: 151/95   BP Location: Left arm   Patient Position: Sitting   Pulse: 84   SpO2: 98%   Weight: 107 kg (236 lb 12.8 oz)   Height: 165.1 cm (65\")      /95 (BP Location: Left arm, Patient Position: Sitting)  Pulse 84  Ht 165.1 cm (65\")  Wt 107 " kg (236 lb 12.8 oz)  SpO2 98%  BMI 39.41 kg/m2    Lab Results (most recent)     None          Physical Exam   Constitutional: She is oriented to person, place, and time. She appears well-developed and well-nourished. No distress.   HENT:   Head: Normocephalic and atraumatic.   Eyes: EOM are normal. Pupils are equal, round, and reactive to light.   Neck: No JVD present.   Cardiovascular: Normal rate, regular rhythm, normal heart sounds and intact distal pulses.  Exam reveals no gallop and no friction rub.    No murmur heard.  Pulmonary/Chest: Effort normal and breath sounds normal. No respiratory distress. She has no wheezes. She has no rales. She exhibits no tenderness.   Musculoskeletal: Normal range of motion. She exhibits no edema.   Neurological: She is alert and oriented to person, place, and time. No cranial nerve deficit.   Skin: Skin is warm and dry. No rash noted. No erythema. No pallor.   Psychiatric: She has a normal mood and affect. Her behavior is normal.   Nursing note and vitals reviewed.      Procedure   Procedures       Assessment/Plan     Problems Addressed this Visit        Cardiovascular and Mediastinum    Palpitations    Relevant Orders    Stress Test With Myocardial Perfusion One Day    Adult Transthoracic Echo Complete W/ Cont if Necessary Per Protocol       Respiratory    Shortness of breath    Relevant Orders    Stress Test With Myocardial Perfusion One Day    Adult Transthoracic Echo Complete W/ Cont if Necessary Per Protocol       Nervous and Auditory    Chest pain - Primary    Relevant Orders    Stress Test With Myocardial Perfusion One Day    Adult Transthoracic Echo Complete W/ Cont if Necessary Per Protocol           recommendation  1.  With new onset chest pain associated with left arm discomfort, I feel ischemia assessment is warranted.  Therefore we'll schedule for Lexiscan stress test.  Echocardiogram to evaluate LV function.  2.  I would like to empirically treat for ischemia by  adding isosorbide.  Upon follow-up, if she continues to have pain, we may have to consider catheterization if stress test is negative as it was previously.  We will await stress test results to direct therapy further.  3.  Otherwise we'll see her back follow-up as scheduled.  Any chest pain not resolved by nitroglycerin, she is to go to the ER.  Follow-up primary as scheduled         Electronically signed by:

## 2018-01-31 DIAGNOSIS — I10 ESSENTIAL HYPERTENSION: ICD-10-CM

## 2018-01-31 DIAGNOSIS — R00.2 PALPITATIONS: ICD-10-CM

## 2018-01-31 DIAGNOSIS — R60.9 PERIPHERAL EDEMA: ICD-10-CM

## 2018-01-31 RX ORDER — FUROSEMIDE 20 MG/1
TABLET ORAL
Qty: 90 TABLET | Refills: 3 | Status: SHIPPED | OUTPATIENT
Start: 2018-01-31 | End: 2019-01-08 | Stop reason: SDUPTHER

## 2018-01-31 RX ORDER — DILTIAZEM HYDROCHLORIDE 120 MG/1
120 CAPSULE, EXTENDED RELEASE ORAL DAILY
Qty: 90 CAPSULE | Refills: 3 | Status: SHIPPED | OUTPATIENT
Start: 2018-01-31 | End: 2018-06-26 | Stop reason: SDUPTHER

## 2018-02-05 ENCOUNTER — HOSPITAL ENCOUNTER (OUTPATIENT)
Dept: CARDIOLOGY | Facility: HOSPITAL | Age: 43
Discharge: HOME | End: 2018-02-05

## 2018-02-05 ENCOUNTER — OUTSIDE FACILITY SERVICE (OUTPATIENT)
Dept: CARDIOLOGY | Facility: CLINIC | Age: 43
End: 2018-02-05

## 2018-02-05 LAB
MAXIMAL PREDICTED HEART RATE: 176 BPM
MAXIMAL PREDICTED HEART RATE: 176 BPM
STRESS TARGET HR: 150 BPM
STRESS TARGET HR: 150 BPM

## 2018-02-05 PROCEDURE — 78452 HT MUSCLE IMAGE SPECT MULT: CPT

## 2018-02-05 PROCEDURE — 0 TECHNETIUM SESTAMIBI: Performed by: INTERNAL MEDICINE

## 2018-02-05 PROCEDURE — A9500 TC99M SESTAMIBI: HCPCS | Performed by: INTERNAL MEDICINE

## 2018-02-05 PROCEDURE — 25010000002 REGADENOSON 0.4 MG/5ML SOLUTION: Performed by: INTERNAL MEDICINE

## 2018-02-05 PROCEDURE — 93306 TTE W/DOPPLER COMPLETE: CPT

## 2018-02-05 PROCEDURE — 93017 CV STRESS TEST TRACING ONLY: CPT

## 2018-02-05 RX ADMIN — TECHNETIUM TC 99M SESTAMIBI 1 DOSE: 1 INJECTION INTRAVENOUS at 08:45

## 2018-02-05 RX ADMIN — REGADENOSON 0.4 MG: 0.08 INJECTION, SOLUTION INTRAVENOUS at 08:45

## 2018-02-06 PROCEDURE — 93018 CV STRESS TEST I&R ONLY: CPT | Performed by: INTERNAL MEDICINE

## 2018-02-06 PROCEDURE — 78452 HT MUSCLE IMAGE SPECT MULT: CPT | Performed by: INTERNAL MEDICINE

## 2018-02-06 PROCEDURE — 93306 TTE W/DOPPLER COMPLETE: CPT | Performed by: INTERNAL MEDICINE

## 2018-02-07 ENCOUNTER — DOCUMENTATION (OUTPATIENT)
Dept: CARDIOLOGY | Facility: CLINIC | Age: 43
End: 2018-02-07

## 2018-02-27 ENCOUNTER — APPOINTMENT (OUTPATIENT)
Dept: WOMENS IMAGING | Facility: HOSPITAL | Age: 43
End: 2018-02-27

## 2018-02-27 PROCEDURE — 77063 BREAST TOMOSYNTHESIS BI: CPT | Performed by: RADIOLOGY

## 2018-02-27 PROCEDURE — 77067 SCR MAMMO BI INCL CAD: CPT | Performed by: RADIOLOGY

## 2018-03-15 ENCOUNTER — OFFICE VISIT (OUTPATIENT)
Dept: CARDIOLOGY | Facility: CLINIC | Age: 43
End: 2018-03-15

## 2018-03-15 VITALS
BODY MASS INDEX: 39.58 KG/M2 | HEIGHT: 65 IN | WEIGHT: 237.6 LBS | HEART RATE: 81 BPM | OXYGEN SATURATION: 98 % | SYSTOLIC BLOOD PRESSURE: 138 MMHG | DIASTOLIC BLOOD PRESSURE: 96 MMHG

## 2018-03-15 DIAGNOSIS — R07.9 CHEST PAIN, UNSPECIFIED TYPE: ICD-10-CM

## 2018-03-15 DIAGNOSIS — J06.9 UPPER RESPIRATORY TRACT INFECTION, UNSPECIFIED TYPE: ICD-10-CM

## 2018-03-15 DIAGNOSIS — R00.2 PALPITATIONS: ICD-10-CM

## 2018-03-15 DIAGNOSIS — I10 ESSENTIAL HYPERTENSION: Primary | ICD-10-CM

## 2018-03-15 DIAGNOSIS — R06.02 SHORTNESS OF BREATH: ICD-10-CM

## 2018-03-15 PROCEDURE — 99213 OFFICE O/P EST LOW 20 MIN: CPT | Performed by: NURSE PRACTITIONER

## 2018-03-15 RX ORDER — AZITHROMYCIN 250 MG/1
TABLET, FILM COATED ORAL
Qty: 6 TABLET | Refills: 0 | Status: SHIPPED | OUTPATIENT
Start: 2018-03-15 | End: 2018-06-26

## 2018-03-15 NOTE — PATIENT INSTRUCTIONS
Obesity, Adult  Obesity is the condition of having too much total body fat. Being overweight or obese means that your weight is greater than what is considered healthy for your body size. Obesity is determined by a measurement called BMI. BMI is an estimate of body fat and is calculated from height and weight. For adults, a BMI of 30 or higher is considered obese.  Obesity can eventually lead to other health concerns and major illnesses, including:  · Stroke.  · Coronary artery disease (CAD).  · Type 2 diabetes.  · Some types of cancer, including cancers of the colon, breast, uterus, and gallbladder.  · Osteoarthritis.  · High blood pressure (hypertension).  · High cholesterol.  · Sleep apnea.  · Gallbladder stones.  · Infertility problems.  What are the causes?  The main cause of obesity is taking in (consuming) more calories than your body uses for energy. Other factors that contribute to this condition may include:  · Being born with genes that make you more likely to become obese.  · Having a medical condition that causes obesity. These conditions include:  ¨ Hypothyroidism.  ¨ Polycystic ovarian syndrome (PCOS).  ¨ Binge-eating disorder.  ¨ Cushing syndrome.  · Taking certain medicines, such as steroids, antidepressants, and seizure medicines.  · Not being physically active (sedentary lifestyle).  · Living where there are limited places to exercise safely or buy healthy foods.  · Not getting enough sleep.  What increases the risk?  The following factors may increase your risk of this condition:  · Having a family history of obesity.  · Being a woman of -American descent.  · Being a man of  descent.  What are the signs or symptoms?  Having excessive body fat is the main symptom of this condition.  How is this diagnosed?  This condition may be diagnosed based on:  · Your symptoms.  · Your medical history.  · A physical exam. Your health care provider may measure:  ¨ Your BMI. If you are an adult  with a BMI between 25 and less than 30, you are considered overweight. If you are an adult with a BMI of 30 or higher, you are considered obese.  ¨ The distances around your hips and your waist (circumferences). These may be compared to each other to help diagnose your condition.  ¨ Your skinfold thickness. Your health care provider may gently pinch a fold of your skin and measure it.  How is this treated?  Treatment for this condition often includes changing your lifestyle. Treatment may include some or all of the following:  · Dietary changes. Work with your health care provider and a dietitian to set a weight-loss goal that is healthy and reasonable for you. Dietary changes may include eating:  ¨ Smaller portions. A portion size is the amount of a particular food that is healthy for you to eat at one time. This varies from person to person.  ¨ Low-calorie or low-fat options.  ¨ More whole grains, fruits, and vegetables.  · Regular physical activity. This may include aerobic activity (cardio) and strength training.  · Medicine to help you lose weight. Your health care provider may prescribe medicine if you are unable to lose 1 pound a week after 6 weeks of eating more healthily and doing more physical activity.  · Surgery. Surgical options may include gastric banding and gastric bypass. Surgery may be done if:  ¨ Other treatments have not helped to improve your condition.  ¨ You have a BMI of 40 or higher.  ¨ You have life-threatening health problems related to obesity.  Follow these instructions at home:     Eating and drinking     · Follow recommendations from your health care provider about what you eat and drink. Your health care provider may advise you to:  ¨ Limit fast foods, sweets, and processed snack foods.  ¨ Choose low-fat options, such as low-fat milk instead of whole milk.  ¨ Eat 5 or more servings of fruits or vegetables every day.  ¨ Eat at home more often. This gives you more control over what you  eat.  ¨ Choose healthy foods when you eat out.  ¨ Learn what a healthy portion size is.  ¨ Keep low-fat snacks on hand.  ¨ Avoid sugary drinks, such as soda, fruit juice, iced tea sweetened with sugar, and flavored milk.  ¨ Eat a healthy breakfast.  · Drink enough water to keep your urine clear or pale yellow.  · Do not go without eating for long periods of time (do not fast) or follow a fad diet. Fasting and fad diets can be unhealthy and even dangerous.  Physical Activity   · Exercise regularly, as told by your health care provider. Ask your health care provider what types of exercise are safe for you and how often you should exercise.  · Warm up and stretch before being active.  · Cool down and stretch after being active.  · Rest between periods of activity.  Lifestyle   · Limit the time that you spend in front of your TV, computer, or video game system.  · Find ways to reward yourself that do not involve food.  · Limit alcohol intake to no more than 1 drink a day for nonpregnant women and 2 drinks a day for men. One drink equals 12 oz of beer, 5 oz of wine, or 1½ oz of hard liquor.  General instructions   · Keep a weight loss journal to keep track of the food you eat and how much you exercise you get.  · Take over-the-counter and prescription medicines only as told by your health care provider.  · Take vitamins and supplements only as told by your health care provider.  · Consider joining a support group. Your health care provider may be able to recommend a support group.  · Keep all follow-up visits as told by your health care provider. This is important.  Contact a health care provider if:  · You are unable to meet your weight loss goal after 6 weeks of dietary and lifestyle changes.  This information is not intended to replace advice given to you by your health care provider. Make sure you discuss any questions you have with your health care provider.  Document Released: 01/25/2006 Document Revised:  05/22/2017 Document Reviewed: 10/05/2016  [employer] Interactive Patient Education © 2017 [employer]  MyPlate from [employer]  The general, healthful diet is based on the 2010 Dietary Guidelines for Americans. The amount of food you need to eat from each food group depends on your age, sex, and level of physical activity and can be individualized by a dietitian. Go to ChooseMyPlate.gov for more information.  What do I need to know about the MyPlate plan?  · Enjoy your food, but eat less.  · Avoid oversized portions.  ¨ ½ of your plate should include fruits and vegetables.  ¨ ¼ of your plate should be grains.  ¨ ¼ of your plate should be protein.  Grains   · Make at least half of your grains whole grains.  · For a 2,000 calorie daily food plan, eat 6 oz every day.  · 1 oz is about 1 slice bread, 1 cup cereal, or ½ cup cooked rice, cereal, or pasta.  Vegetables   · Make half your plate fruits and vegetables.  · For a 2,000 calorie daily food plan, eat 2½ cups every day.  · 1 cup is about 1 cup raw or cooked vegetables or vegetable juice or 2 cups raw leafy greens.  Fruits   · Make half your plate fruits and vegetables.  · For a 2,000 calorie daily food plan, eat 2 cups every day.  · 1 cup is about 1 cup fruit or 100% fruit juice or ½ cup dried fruit.  Protein   · For a 2,000 calorie daily food plan, eat 5½ oz every day.  · 1 oz is about 1 oz meat, poultry, or fish, ¼ cup cooked beans, 1 egg, 1 Tbsp peanut butter, or ½ oz nuts or seeds.  Dairy   · Switch to fat-free or low-fat (1%) milk.  · For a 2,000 calorie daily food plan, eat 3 cups every day.  · 1 cup is about 1 cup milk or yogurt or soy milk (soy beverage), 1½ oz natural cheese, or 2 oz processed cheese.  Fats, Oils, and Empty Calories   · Only small amounts of oils are recommended.  · Empty calories are calories from solid fats or added sugars.  · Compare sodium in foods like soup, bread, and frozen meals. Choose the foods with lower numbers.  · Drink water instead  of sugary drinks.  What foods can I eat?  Grains   Whole grains such as whole wheat, quinoa, millet, and bulgur. Bread, rolls, and pasta made from whole grains. Brown or wild rice. Hot or cold cereals made from whole grains and without added sugar.  Vegetables   All fresh vegetables, especially fresh red, dark green, or orange vegetables. Peas and beans. Low-sodium frozen or canned vegetables prepared without added salt. Low-sodium vegetable juices.  Fruits   All fresh, frozen, and dried fruits. Canned fruit packed in water or fruit juice without added sugar. Fruit juices without added sugar.  Meats and Other Protein Sources   Boiled, baked, or grilled lean meat trimmed of fat. Skinless poultry. Fresh seafood and shellfish. Canned seafood packed in water. Unsalted nuts and unsalted nut butters. Tofu. Dried beans and pea. Eggs.  Dairy   Low-fat or fat-free milk, yogurt, and cheeses.  Sweets and Desserts   Frozen desserts made from low-fat milk.  Fats and Oils   Olive, peanut, and canola oils and margarine. Salad dressing and mayonnaise made from these oils.  Other   Soups and casseroles made from allowed ingredients and without added fat or salt.  The items listed above may not be a complete list of recommended foods or beverages. Contact your dietitian for more options.   What foods are not recommended?  Grains   Sweetened, low-fiber cereals. Packaged baked goods. Snack crackers and chips. Cheese crackers, butter crackers, and biscuits. Frozen waffles, sweet breads, doughnuts, pastries, packaged baking mixes, pancakes, cakes, and cookies.  Vegetables   Regular canned or frozen vegetables or vegetables prepared with salt. Canned tomatoes. Canned tomato sauce. Fried vegetables. Vegetables in cream sauce or cheese sauce.  Fruits   Fruits packed in syrup or made with added sugar.  Meats and Other Protein Sources   Marbled or fatty meats such as ribs. Poultry with skin. Fried meats, poultry, eggs, or fish. Sausages, hot  "dogs, and deli meats such as pastrami, bologna, or salami.  Dairy   Whole milk, cream, cheeses made from whole milk, sour cream. Ice cream or yogurt made from whole milk or with added sugar.  Beverages   For adults, no more than one alcoholic drink per day. Regular soft drinks or other sugary beverages. Juice drinks.  Sweets and Desserts   Sugary or fatty desserts, candy, and other sweets.  Fats and Oils   Solid shortening or partially hydrogenated oils. Solid margarine. Margarine that contains trans fats. Butter.  The items listed above may not be a complete list of foods and beverages to avoid. Contact your dietitian for more information.   This information is not intended to replace advice given to you by your health care provider. Make sure you discuss any questions you have with your health care provider.  Document Released: 01/06/2009 Document Revised: 05/25/2017 Document Reviewed: 11/26/2014  [employer] Interactive Patient Education © 2017 [employer]    Hypertension  Hypertension, commonly called high blood pressure, is when the force of blood pumping through the arteries is too strong. The arteries are the blood vessels that carry blood from the heart throughout the body. Hypertension forces the heart to work harder to pump blood and may cause arteries to become narrow or stiff. Having untreated or uncontrolled hypertension can cause heart attacks, strokes, kidney disease, and other problems.  A blood pressure reading consists of a higher number over a lower number. Ideally, your blood pressure should be below 120/80. The first (\"top\") number is called the systolic pressure. It is a measure of the pressure in your arteries as your heart beats. The second (\"bottom\") number is called the diastolic pressure. It is a measure of the pressure in your arteries as the heart relaxes.  What are the causes?  The cause of this condition is not known.  What increases the risk?  Some risk factors for high blood pressure " are under your control. Others are not.  Factors you can change   · Smoking.  · Having type 2 diabetes mellitus, high cholesterol, or both.  · Not getting enough exercise or physical activity.  · Being overweight.  · Having too much fat, sugar, calories, or salt (sodium) in your diet.  · Drinking too much alcohol.  Factors that are difficult or impossible to change   · Having chronic kidney disease.  · Having a family history of high blood pressure.  · Age. Risk increases with age.  · Race. You may be at higher risk if you are -American.  · Gender. Men are at higher risk than women before age 45. After age 65, women are at higher risk than men.  · Having obstructive sleep apnea.  · Stress.  What are the signs or symptoms?  Extremely high blood pressure (hypertensive crisis) may cause:  · Headache.  · Anxiety.  · Shortness of breath.  · Nosebleed.  · Nausea and vomiting.  · Severe chest pain.  · Jerky movements you cannot control (seizures).  How is this diagnosed?  This condition is diagnosed by measuring your blood pressure while you are seated, with your arm resting on a surface. The cuff of the blood pressure monitor will be placed directly against the skin of your upper arm at the level of your heart. It should be measured at least twice using the same arm. Certain conditions can cause a difference in blood pressure between your right and left arms.  Certain factors can cause blood pressure readings to be lower or higher than normal (elevated) for a short period of time:  · When your blood pressure is higher when you are in a health care provider's office than when you are at home, this is called white coat hypertension. Most people with this condition do not need medicines.  · When your blood pressure is higher at home than when you are in a health care provider's office, this is called masked hypertension. Most people with this condition may need medicines to control blood pressure.  If you have a high  blood pressure reading during one visit or you have normal blood pressure with other risk factors:  · You may be asked to return on a different day to have your blood pressure checked again.  · You may be asked to monitor your blood pressure at home for 1 week or longer.  If you are diagnosed with hypertension, you may have other blood or imaging tests to help your health care provider understand your overall risk for other conditions.  How is this treated?  This condition is treated by making healthy lifestyle changes, such as eating healthy foods, exercising more, and reducing your alcohol intake. Your health care provider may prescribe medicine if lifestyle changes are not enough to get your blood pressure under control, and if:  · Your systolic blood pressure is above 130.  · Your diastolic blood pressure is above 80.  Your personal target blood pressure may vary depending on your medical conditions, your age, and other factors.  Follow these instructions at home:  Eating and drinking   · Eat a diet that is high in fiber and potassium, and low in sodium, added sugar, and fat. An example eating plan is called the DASH (Dietary Approaches to Stop Hypertension) diet. To eat this way:  ¨ Eat plenty of fresh fruits and vegetables. Try to fill half of your plate at each meal with fruits and vegetables.  ¨ Eat whole grains, such as whole wheat pasta, brown rice, or whole grain bread. Fill about one quarter of your plate with whole grains.  ¨ Eat or drink low-fat dairy products, such as skim milk or low-fat yogurt.  ¨ Avoid fatty cuts of meat, processed or cured meats, and poultry with skin. Fill about one quarter of your plate with lean proteins, such as fish, chicken without skin, beans, eggs, and tofu.  ¨ Avoid premade and processed foods. These tend to be higher in sodium, added sugar, and fat.  · Reduce your daily sodium intake. Most people with hypertension should eat less than 1,500 mg of sodium a day.  · Limit  alcohol intake to no more than 1 drink a day for nonpregnant women and 2 drinks a day for men. One drink equals 12 oz of beer, 5 oz of wine, or 1½ oz of hard liquor.  Lifestyle   · Work with your health care provider to maintain a healthy body weight or to lose weight. Ask what an ideal weight is for you.  · Get at least 30 minutes of exercise that causes your heart to beat faster (aerobic exercise) most days of the week. Activities may include walking, swimming, or biking.  · Include exercise to strengthen your muscles (resistance exercise), such as pilates or lifting weights, as part of your weekly exercise routine. Try to do these types of exercises for 30 minutes at least 3 days a week.  · Do not use any products that contain nicotine or tobacco, such as cigarettes and e-cigarettes. If you need help quitting, ask your health care provider.  · Monitor your blood pressure at home as told by your health care provider.  · Keep all follow-up visits as told by your health care provider. This is important.  Medicines   · Take over-the-counter and prescription medicines only as told by your health care provider. Follow directions carefully. Blood pressure medicines must be taken as prescribed.  · Do not skip doses of blood pressure medicine. Doing this puts you at risk for problems and can make the medicine less effective.  · Ask your health care provider about side effects or reactions to medicines that you should watch for.  Contact a health care provider if:  · You think you are having a reaction to a medicine you are taking.  · You have headaches that keep coming back (recurring).  · You feel dizzy.  · You have swelling in your ankles.  · You have trouble with your vision.  Get help right away if:  · You develop a severe headache or confusion.  · You have unusual weakness or numbness.  · You feel faint.  · You have severe pain in your chest or abdomen.  · You vomit repeatedly.  · You have trouble  breathing.  Summary  · Hypertension is when the force of blood pumping through your arteries is too strong. If this condition is not controlled, it may put you at risk for serious complications.  · Your personal target blood pressure may vary depending on your medical conditions, your age, and other factors. For most people, a normal blood pressure is less than 120/80.  · Hypertension is treated with lifestyle changes, medicines, or a combination of both. Lifestyle changes include weight loss, eating a healthy, low-sodium diet, exercising more, and limiting alcohol.  This information is not intended to replace advice given to you by your health care provider. Make sure you discuss any questions you have with your health care provider.  Document Released: 12/18/2006 Document Revised: 11/15/2017 Document Reviewed: 11/15/2017  [employer] Interactive Patient Education © 2017 [employer]

## 2018-03-15 NOTE — PROGRESS NOTES
Subjective   Ellie Hardy is a 42 y.o. female     Chief Complaint   Patient presents with   • Hypertension     presents as a follow up       HPI    Problem List:    1. Hypertension  2. Chest Pain  2.1 Stress Test 3/28/17 - no ischemia; low risk   2.2 Stress Test 2/5/18 - no ischemia; preserved LVEF; inferobasal ischemia   3. Shortness of breath  3.1 Echo 3/28/17 -  Mild to mod LVH; EF 55-60%; DD I; mild MR  3.2 Echo 2/5/18 - mild LVH; septal ridge at the base of septum without MAC or doppler evidence of LVOT obst; EF > 65%; DD I; trace MR  4. Palpitations   4.1 Event Monitor 3/13-3/26/17 - NSR - ST with PVCs/PACs  5. Fatigue    Patient is a 42-year-old female who presents today for follow-up on testing.  She denies any chest pain, pressure, palpitations, fluttering, presyncope, syncope, orthopnea or PND.  She says she has had some swelling in her hands and her face.  She states she did have a little bit of dizziness this past week but she's also had an upper respiratory infection/sinusitis for about the last week and a half.  She says her shortness of breath has been increased because of this as well.    We went over stress, echo and labs.    Current Outpatient Prescriptions   Medication Sig Dispense Refill   • aspirin 81 MG tablet Take 1 tablet by mouth Daily. 30 tablet 11   • CloNIDine (CATAPRES) 0.1 MG tablet Take 1 tablet by mouth 3 (Three) Times a Day As Needed for high blood pressure (SBP > 160 or DBP > 90). 30 tablet 5   • diltiazem XR (DILACOR XR) 120 MG 24 hr capsule Take 1 capsule by mouth Daily. 90 capsule 3   • furosemide (LASIX) 20 MG tablet Take lasix daily; taken 2 tabs of lasix for 2 days then return to normal dose (Patient taking differently: Take 20 mg by mouth Daily.) 90 tablet 3   • nitroglycerin (NITROSTAT) 0.4 MG SL tablet 1 under the tongue as needed for angina, may repeat q5mins for up three doses 30 tablet 5   • potassium chloride (K-DUR) 10 MEQ CR tablet Take 1 tablet when take 2 tablets  of lasix only 15 tablet 5   • azithromycin (ZITHROMAX) 250 MG tablet Take 2 tablets the first day, then 1 tablet daily for 4 days. 6 tablet 0     No current facility-administered medications for this visit.        ALLERGIES    Review of patient's allergies indicates no known allergies.    Past Medical History:   Diagnosis Date   • Hypertension    • Sleep apnea     c-pap, followed by Dr. Loera       Social History     Social History   • Marital status:      Spouse name: N/A   • Number of children: N/A   • Years of education: N/A     Occupational History   • Not on file.     Social History Main Topics   • Smoking status: Never Smoker   • Smokeless tobacco: Never Used   • Alcohol use No   • Drug use: No   • Sexual activity: Defer     Other Topics Concern   • Not on file     Social History Narrative   • No narrative on file       Family History   Problem Relation Age of Onset   • Charcot-Lexy-Tooth disease Mother    • Heart disease Father        Review of Systems   Constitutional: Positive for fatigue.   HENT: Positive for congestion, postnasal drip, sinus pressure and sneezing. Negative for rhinorrhea.    Eyes: Negative for visual disturbance.   Respiratory: Positive for shortness of breath (worse with congestion, has been this way for about 1 1/2 weeks ). Negative for chest tightness.    Cardiovascular: Positive for leg swelling ( hand and face is swelling ). Negative for chest pain and palpitations.   Gastrointestinal: Positive for nausea (due to post nasal drip ). Negative for constipation, diarrhea and vomiting.   Endocrine: Negative.    Genitourinary: Negative for difficulty urinating.   Musculoskeletal: Positive for arthralgias and back pain. Negative for myalgias and neck pain.   Skin: Negative.    Allergic/Immunologic: Positive for environmental allergies.   Neurological: Positive for dizziness (past week, has had some congestion ). Negative for syncope, light-headedness and headaches.   Hematological:  "Does not bruise/bleed easily.   Psychiatric/Behavioral: The patient is nervous/anxious.        Objective   /96 (BP Location: Left arm)   Pulse 81   Ht 165.1 cm (65\")   Wt 108 kg (237 lb 9.6 oz)   SpO2 98%   BMI 39.54 kg/m²   Vitals:    03/15/18 1526 03/15/18 1544   BP: (!) 153/102 138/96   BP Location: Left arm Left arm   Patient Position: Sitting    Pulse: 81    SpO2: 98%    Weight: 108 kg (237 lb 9.6 oz)    Height: 165.1 cm (65\")       Lab Results (most recent)     None        Physical Exam   Constitutional: She is oriented to person, place, and time. She appears well-developed and well-nourished. She is active and cooperative.   HENT:   Head: Normocephalic.   Eyes: Lids are normal.   Neck: Normal carotid pulses, no hepatojugular reflux and no JVD present. Carotid bruit is not present.   Cardiovascular: Normal rate, regular rhythm and normal heart sounds.    Pulses:       Radial pulses are 2+ on the right side, and 2+ on the left side.        Dorsalis pedis pulses are 2+ on the right side, and 2+ on the left side.        Posterior tibial pulses are 2+ on the right side, and 2+ on the left side.   No edema BLE.    Pulmonary/Chest: Effort normal and breath sounds normal.   Abdominal: Normal appearance and bowel sounds are normal.   Neurological: She is alert and oriented to person, place, and time.   Skin: Skin is warm, dry and intact.   Psychiatric: She has a normal mood and affect. Her speech is normal and behavior is normal. Judgment and thought content normal. Cognition and memory are normal.       Procedure   Procedures         Assessment/Plan      Diagnosis Plan   1. Essential hypertension     2. Chest pain, unspecified type     3. Palpitations     4. Upper respiratory tract infection, unspecified type  azithromycin (ZITHROMAX) 250 MG tablet   5. Shortness of breath         Return in about 3 months (around 6/15/2018).       pretension-patient is doing well.  Her blood pressure was up due to the " stress of coming here from work.  Chest pain-this has resolved.  Palpitations-patient is doing very well with the diltiazem and had no more symptoms.  URI-patient will take Zithromax.  She will continue her medication regimen.  She'll follow-up in 3 months or sooner if any changes.  Shortness of breath-a little increased due to URI.      Discussed the patient's BMI with her. BMI is above normal parameters. Follow-up plan includes:  educational material.      Electronically signed by:

## 2018-05-18 DIAGNOSIS — I10 ESSENTIAL HYPERTENSION: ICD-10-CM

## 2018-05-21 RX ORDER — CLONIDINE HYDROCHLORIDE 0.1 MG/1
TABLET ORAL
Qty: 30 TABLET | Refills: 5 | Status: SHIPPED | OUTPATIENT
Start: 2018-05-21

## 2018-06-26 ENCOUNTER — LAB (OUTPATIENT)
Dept: LAB | Facility: HOSPITAL | Age: 43
End: 2018-06-26

## 2018-06-26 ENCOUNTER — OFFICE VISIT (OUTPATIENT)
Dept: CARDIOLOGY | Facility: CLINIC | Age: 43
End: 2018-06-26

## 2018-06-26 VITALS
DIASTOLIC BLOOD PRESSURE: 101 MMHG | SYSTOLIC BLOOD PRESSURE: 155 MMHG | HEART RATE: 92 BPM | WEIGHT: 238.6 LBS | OXYGEN SATURATION: 99 % | HEIGHT: 65 IN | BODY MASS INDEX: 39.75 KG/M2

## 2018-06-26 DIAGNOSIS — Z00.00 HEALTHCARE MAINTENANCE: ICD-10-CM

## 2018-06-26 DIAGNOSIS — R07.2 PRECORDIAL PAIN: ICD-10-CM

## 2018-06-26 DIAGNOSIS — R07.2 PRECORDIAL PAIN: Primary | ICD-10-CM

## 2018-06-26 DIAGNOSIS — R06.02 SHORTNESS OF BREATH: ICD-10-CM

## 2018-06-26 DIAGNOSIS — R00.2 PALPITATIONS: ICD-10-CM

## 2018-06-26 DIAGNOSIS — I10 ESSENTIAL HYPERTENSION: ICD-10-CM

## 2018-06-26 PROCEDURE — 82553 CREATINE MB FRACTION: CPT | Performed by: NURSE PRACTITIONER

## 2018-06-26 PROCEDURE — 36415 COLL VENOUS BLD VENIPUNCTURE: CPT

## 2018-06-26 PROCEDURE — 99214 OFFICE O/P EST MOD 30 MIN: CPT | Performed by: NURSE PRACTITIONER

## 2018-06-26 PROCEDURE — 84484 ASSAY OF TROPONIN QUANT: CPT | Performed by: NURSE PRACTITIONER

## 2018-06-26 PROCEDURE — 93000 ELECTROCARDIOGRAM COMPLETE: CPT | Performed by: NURSE PRACTITIONER

## 2018-06-26 PROCEDURE — 80048 BASIC METABOLIC PNL TOTAL CA: CPT | Performed by: NURSE PRACTITIONER

## 2018-06-26 RX ORDER — DULOXETIN HYDROCHLORIDE 30 MG/1
30 CAPSULE, DELAYED RELEASE ORAL DAILY
COMMUNITY
Start: 2018-05-16 | End: 2020-10-15 | Stop reason: ALTCHOICE

## 2018-06-26 RX ORDER — MELATONIN
1000 DAILY
COMMUNITY

## 2018-06-26 RX ORDER — CLOPIDOGREL BISULFATE 75 MG/1
75 TABLET ORAL DAILY
Qty: 30 TABLET | Refills: 11 | Status: SHIPPED | OUTPATIENT
Start: 2018-06-26 | End: 2018-07-30

## 2018-06-26 RX ORDER — DILTIAZEM HYDROCHLORIDE 180 MG/1
180 CAPSULE, EXTENDED RELEASE ORAL DAILY
Qty: 90 CAPSULE | Refills: 3 | Status: SHIPPED | OUTPATIENT
Start: 2018-06-26 | End: 2018-07-30 | Stop reason: SDUPTHER

## 2018-06-26 NOTE — PROGRESS NOTES
Subjective   Ellie Hardy is a 42 y.o. female     Chief Complaint   Patient presents with   • Hypertension     PRESENTS AS A FOLLOW UP   • Palpitations   • Chest Pain   • Shortness of Breath       HPI    Problem List:    1. Hypertension  2. Chest Pain  2.1 Stress Test 3/28/17 - no ischemia; low risk   2.2 Stress Test 2/5/18 - no ischemia; preserved LVEF; inferobasal akinesis  3. Shortness of breath  3.1 Echo 3/28/17 -  Mild to mod LVH; EF 55-60%; DD I; mild MR  3.2 Echo 2/5/18 - mild LVH; septal ridge at the base of septum without MAC or doppler evidence of LVOT obst; EF > 65%; DD I; trace MR  4. Palpitations   4.1 Event Monitor 3/13-3/26/17 - NSR - ST with PVCs/PACs  5. Fatigue    Patient is a 42-year-old female who presents today for follow-up.  She continues to have midsternum chest heaviness.  She says that it does not radiate and she is not taken any nitroglycerin.  She says that she will get diaphoretic, lightheaded and short of breath when it occurs.  She says it lasted 5-10 minutes.  She says it mostly occurs with activity about 3-4 times a month.  She says if she stops and rest she says symptoms will resolve.  Patient says she hasn't really noticed any more palpitations but she has a quivering sensation in her body.  She says that she will usually get dizzy when she stands for a while or at night when she lays back.  She says it is a spinning sensation.  She denies any presyncope, syncope, orthopnea or PND.  She does get a little bit of swelling in her legs.  She says she does get short of breath if she does more than normal.  She says she's absolutely fatigued all of the time.  He has had to take some clonidine due to elevated blood pressure.  Patient says she has been to rheumatologist as well due to possible autoimmune diseases but when she is retested she was normal so he states that they were probably false positives.  She also says that she had elevated liver enzymes and when this will recheck they  were still elevated.     Current Outpatient Prescriptions   Medication Sig Dispense Refill   • aspirin 81 MG tablet Take 1 tablet by mouth Daily. 30 tablet 11   • cholecalciferol (VITAMIN D3) 1000 units tablet Take 1,000 Units by mouth Daily.     • CloNIDine (CATAPRES) 0.1 MG tablet take 1 tablet by mouth three times a day if needed for HIGH 30 tablet 5   • diltiazem XR (DILACOR XR) 180 MG 24 hr capsule Take 1 capsule by mouth Daily. 90 capsule 3   • DULoxetine (CYMBALTA) 30 MG capsule Take 30 mg by mouth Daily.     • furosemide (LASIX) 20 MG tablet Take lasix daily; taken 2 tabs of lasix for 2 days then return to normal dose (Patient taking differently: Take 20 mg by mouth Daily.) 90 tablet 3   • nitroglycerin (NITROSTAT) 0.4 MG SL tablet 1 under the tongue as needed for angina, may repeat q5mins for up three doses 30 tablet 5   • potassium chloride (K-DUR) 10 MEQ CR tablet Take 1 tablet when take 2 tablets of lasix only 15 tablet 5   • clopidogrel (PLAVIX) 75 MG tablet Take 1 tablet by mouth Daily. 30 tablet 11     No current facility-administered medications for this visit.        ALLERGIES    Patient has no known allergies.    Past Medical History:   Diagnosis Date   • Hypertension    • Sleep apnea     c-pap, followed by Dr. Loera       Social History     Social History   • Marital status:      Spouse name: N/A   • Number of children: N/A   • Years of education: N/A     Occupational History   • Not on file.     Social History Main Topics   • Smoking status: Never Smoker   • Smokeless tobacco: Never Used   • Alcohol use No   • Drug use: No   • Sexual activity: Defer     Other Topics Concern   • Not on file     Social History Narrative   • No narrative on file       Family History   Problem Relation Age of Onset   • Charcot-Lexy-Tooth disease Mother    • Heart disease Father        Review of Systems   Constitutional: Positive for diaphoresis (WITH CP ) and fatigue (ALL OF THE TIME ).   HENT: Negative for  "sneezing.    Eyes: Negative for visual disturbance.   Respiratory: Positive for shortness of breath (IF DO MORE THAN NORMAL; WITH CP ). Negative for chest tightness.    Cardiovascular: Positive for chest pain (HEAVINESS MIDDLE USUALLY; NO RAD; NO NITRO;  RESOLVES WITH REST, LASTS 5-10; OCCURS 3-4 X A MONTH), palpitations ( QUIVER FEELING, FEELS LIKE HANDS SUSY ) and leg swelling (MILDLY ).   Gastrointestinal: Negative for constipation, diarrhea, nausea and vomiting.   Endocrine: Negative.    Genitourinary: Negative for difficulty urinating.   Musculoskeletal: Positive for arthralgias (IMPROVED SINCE CYMBALTA) and myalgias ( RESTLESSNESS ). Negative for back pain and neck pain.   Skin: Negative.    Allergic/Immunologic: Negative for environmental allergies and food allergies.   Neurological: Positive for dizziness (USUALLY WHEN SHE STANDS OR AT NIGHT WHEN SHE LAYS BACK; ROOM SPINNING ) and light-headedness (WITH CP ). Negative for syncope.   Hematological: Does not bruise/bleed easily.   Psychiatric/Behavioral: The patient is nervous/anxious.        Objective   BP (!) 155/101 (BP Location: Left arm, Patient Position: Sitting)   Pulse 92   Ht 165.1 cm (65\")   Wt 108 kg (238 lb 9.6 oz)   SpO2 99%   BMI 39.71 kg/m²   Vitals:    06/26/18 1514   BP: (!) 155/101   BP Location: Left arm   Patient Position: Sitting   Pulse: 92   SpO2: 99%   Weight: 108 kg (238 lb 9.6 oz)   Height: 165.1 cm (65\")      Lab Results (most recent)     None        Physical Exam   Constitutional: She is oriented to person, place, and time. She appears well-developed. She is active and cooperative.   HENT:   Head: Normocephalic.   Eyes: Lids are normal.   Neck: Normal carotid pulses, no hepatojugular reflux and no JVD present. Carotid bruit is not present.   Cardiovascular: Normal rate, regular rhythm and normal heart sounds.    Pulses:       Radial pulses are 2+ on the right side, and 2+ on the left side.        Dorsalis pedis pulses are 2+ " on the right side, and 2+ on the left side.        Posterior tibial pulses are 2+ on the right side, and 2+ on the left side.   Trace edema BLE.    Pulmonary/Chest: Effort normal and breath sounds normal.   Abdominal: Normal appearance and bowel sounds are normal.   Neurological: She is alert and oriented to person, place, and time.   Skin: Skin is warm, dry and intact.   Psychiatric: She has a normal mood and affect. Her speech is normal and behavior is normal. Judgment and thought content normal. Cognition and memory are normal.       Procedure     ECG 12 Lead  Date/Time: 6/26/2018 4:40 PM  Performed by: OLY MAO  Authorized by: OLY MAO   Rhythm: sinus rhythm  Rate: normal  BPM: 82  QRS axis: normal  Clinical impression: normal ECG               Assessment/Plan      Diagnosis Plan   1. Precordial pain  clopidogrel (PLAVIX) 75 MG tablet    Cardiac catheterization    Troponin    CK-MB   2. Essential hypertension  diltiazem XR (DILACOR XR) 180 MG 24 hr capsule    Basic Metabolic Panel    Cardiac catheterization   3. Shortness of breath  Cardiac catheterization    Troponin    CK-MB   4. Palpitations  diltiazem XR (DILACOR XR) 180 MG 24 hr capsule    Cardiac catheterization   5. Healthcare maintenance  Basic Metabolic Panel       Return for After testing.       chest pain/hypertension/shortness of breath/palpitations-patient will have left heart catheter due to continued symptoms.  She will start Plavix.  She will use nitroglycerin when necessary for chest pain no resolution she will go to the ER.  I am increasing her diltiazem to 180.  She will get a BMP, troponin and CK-MB today.  She will continue her medication regimen otherwise.  I am holding off on starting her on a statin due to elevated liver enzymes per patient's report and I will have to get a copy of this were PCP.  She will follow-up after left heart catheter or sooner if any changes.    Negative for orthostatic hypotension.     Patient's  Body mass index is 39.71 kg/m². BMI is above normal parameters. Recommendations include: educational material.      Electronically signed by:

## 2018-06-26 NOTE — PATIENT INSTRUCTIONS
Obesity, Adult  Obesity is the condition of having too much total body fat. Being overweight or obese means that your weight is greater than what is considered healthy for your body size. Obesity is determined by a measurement called BMI. BMI is an estimate of body fat and is calculated from height and weight. For adults, a BMI of 30 or higher is considered obese.  Obesity can eventually lead to other health concerns and major illnesses, including:  · Stroke.  · Coronary artery disease (CAD).  · Type 2 diabetes.  · Some types of cancer, including cancers of the colon, breast, uterus, and gallbladder.  · Osteoarthritis.  · High blood pressure (hypertension).  · High cholesterol.  · Sleep apnea.  · Gallbladder stones.  · Infertility problems.    What are the causes?  The main cause of obesity is taking in (consuming) more calories than your body uses for energy. Other factors that contribute to this condition may include:  · Being born with genes that make you more likely to become obese.  · Having a medical condition that causes obesity. These conditions include:  ? Hypothyroidism.  ? Polycystic ovarian syndrome (PCOS).  ? Binge-eating disorder.  ? Cushing syndrome.  · Taking certain medicines, such as steroids, antidepressants, and seizure medicines.  · Not being physically active (sedentary lifestyle).  · Living where there are limited places to exercise safely or buy healthy foods.  · Not getting enough sleep.    What increases the risk?  The following factors may increase your risk of this condition:  · Having a family history of obesity.  · Being a woman of -American descent.  · Being a man of  descent.    What are the signs or symptoms?  Having excessive body fat is the main symptom of this condition.  How is this diagnosed?  This condition may be diagnosed based on:  · Your symptoms.  · Your medical history.  · A physical exam. Your health care provider may measure:  ? Your BMI. If you are an  adult with a BMI between 25 and less than 30, you are considered overweight. If you are an adult with a BMI of 30 or higher, you are considered obese.  ? The distances around your hips and your waist (circumferences). These may be compared to each other to help diagnose your condition.  ? Your skinfold thickness. Your health care provider may gently pinch a fold of your skin and measure it.    How is this treated?  Treatment for this condition often includes changing your lifestyle. Treatment may include some or all of the following:  · Dietary changes. Work with your health care provider and a dietitian to set a weight-loss goal that is healthy and reasonable for you. Dietary changes may include eating:  ? Smaller portions. A portion size is the amount of a particular food that is healthy for you to eat at one time. This varies from person to person.  ? Low-calorie or low-fat options.  ? More whole grains, fruits, and vegetables.  · Regular physical activity. This may include aerobic activity (cardio) and strength training.  · Medicine to help you lose weight. Your health care provider may prescribe medicine if you are unable to lose 1 pound a week after 6 weeks of eating more healthily and doing more physical activity.  · Surgery. Surgical options may include gastric banding and gastric bypass. Surgery may be done if:  ? Other treatments have not helped to improve your condition.  ? You have a BMI of 40 or higher.  ? You have life-threatening health problems related to obesity.    Follow these instructions at home:    Eating and drinking    · Follow recommendations from your health care provider about what you eat and drink. Your health care provider may advise you to:  ? Limit fast foods, sweets, and processed snack foods.  ? Choose low-fat options, such as low-fat milk instead of whole milk.  ? Eat 5 or more servings of fruits or vegetables every day.  ? Eat at home more often. This gives you more control over  what you eat.  ? Choose healthy foods when you eat out.  ? Learn what a healthy portion size is.  ? Keep low-fat snacks on hand.  ? Avoid sugary drinks, such as soda, fruit juice, iced tea sweetened with sugar, and flavored milk.  ? Eat a healthy breakfast.  · Drink enough water to keep your urine clear or pale yellow.  · Do not go without eating for long periods of time (do not fast) or follow a fad diet. Fasting and fad diets can be unhealthy and even dangerous.  Physical Activity  · Exercise regularly, as told by your health care provider. Ask your health care provider what types of exercise are safe for you and how often you should exercise.  · Warm up and stretch before being active.  · Cool down and stretch after being active.  · Rest between periods of activity.  Lifestyle  · Limit the time that you spend in front of your TV, computer, or video game system.  · Find ways to reward yourself that do not involve food.  · Limit alcohol intake to no more than 1 drink a day for nonpregnant women and 2 drinks a day for men. One drink equals 12 oz of beer, 5 oz of wine, or 1½ oz of hard liquor.  General instructions  · Keep a weight loss journal to keep track of the food you eat and how much you exercise you get.  · Take over-the-counter and prescription medicines only as told by your health care provider.  · Take vitamins and supplements only as told by your health care provider.  · Consider joining a support group. Your health care provider may be able to recommend a support group.  · Keep all follow-up visits as told by your health care provider. This is important.  Contact a health care provider if:  · You are unable to meet your weight loss goal after 6 weeks of dietary and lifestyle changes.  This information is not intended to replace advice given to you by your health care provider. Make sure you discuss any questions you have with your health care provider.  Document Released: 01/25/2006 Document Revised:  05/22/2017 Document Reviewed: 10/05/2016  [employer] Interactive Patient Education © 2018 [employer]  MyPlate from [employer]  The general, healthful diet is based on the 2010 Dietary Guidelines for Americans. The amount of food you need to eat from each food group depends on your age, sex, and level of physical activity and can be individualized by a dietitian. Go to ChooseMyPlate.gov for more information.  What do I need to know about the MyPlate plan?  · Enjoy your food, but eat less.  · Avoid oversized portions.  ? ½ of your plate should include fruits and vegetables.  ? ¼ of your plate should be grains.  ? ¼ of your plate should be protein.  Grains  · Make at least half of your grains whole grains.  · For a 2,000 calorie daily food plan, eat 6 oz every day.  · 1 oz is about 1 slice bread, 1 cup cereal, or ½ cup cooked rice, cereal, or pasta.  Vegetables  · Make half your plate fruits and vegetables.  · For a 2,000 calorie daily food plan, eat 2½ cups every day.  · 1 cup is about 1 cup raw or cooked vegetables or vegetable juice or 2 cups raw leafy greens.  Fruits  · Make half your plate fruits and vegetables.  · For a 2,000 calorie daily food plan, eat 2 cups every day.  · 1 cup is about 1 cup fruit or 100% fruit juice or ½ cup dried fruit.  Protein  · For a 2,000 calorie daily food plan, eat 5½ oz every day.  · 1 oz is about 1 oz meat, poultry, or fish, ¼ cup cooked beans, 1 egg, 1 Tbsp peanut butter, or ½ oz nuts or seeds.  Dairy  · Switch to fat-free or low-fat (1%) milk.  · For a 2,000 calorie daily food plan, eat 3 cups every day.  · 1 cup is about 1 cup milk or yogurt or soy milk (soy beverage), 1½ oz natural cheese, or 2 oz processed cheese.  Fats, Oils, and Empty Calories  · Only small amounts of oils are recommended.  · Empty calories are calories from solid fats or added sugars.  · Compare sodium in foods like soup, bread, and frozen meals. Choose the foods with lower numbers.  · Drink water instead of  sugary drinks.  What foods can I eat?  Grains  Whole grains such as whole wheat, quinoa, millet, and bulgur. Bread, rolls, and pasta made from whole grains. Brown or wild rice. Hot or cold cereals made from whole grains and without added sugar.  Vegetables  All fresh vegetables, especially fresh red, dark green, or orange vegetables. Peas and beans. Low-sodium frozen or canned vegetables prepared without added salt. Low-sodium vegetable juices.  Fruits  All fresh, frozen, and dried fruits. Canned fruit packed in water or fruit juice without added sugar. Fruit juices without added sugar.  Meats and Other Protein Sources  Boiled, baked, or grilled lean meat trimmed of fat. Skinless poultry. Fresh seafood and shellfish. Canned seafood packed in water. Unsalted nuts and unsalted nut butters. Tofu. Dried beans and pea. Eggs.  Dairy  Low-fat or fat-free milk, yogurt, and cheeses.  Sweets and Desserts  Frozen desserts made from low-fat milk.  Fats and Oils  Olive, peanut, and canola oils and margarine. Salad dressing and mayonnaise made from these oils.  Other  Soups and casseroles made from allowed ingredients and without added fat or salt.  The items listed above may not be a complete list of recommended foods or beverages. Contact your dietitian for more options.  What foods are not recommended?  Grains  Sweetened, low-fiber cereals. Packaged baked goods. Snack crackers and chips. Cheese crackers, butter crackers, and biscuits. Frozen waffles, sweet breads, doughnuts, pastries, packaged baking mixes, pancakes, cakes, and cookies.  Vegetables  Regular canned or frozen vegetables or vegetables prepared with salt. Canned tomatoes. Canned tomato sauce. Fried vegetables. Vegetables in cream sauce or cheese sauce.  Fruits  Fruits packed in syrup or made with added sugar.  Meats and Other Protein Sources  Marbled or fatty meats such as ribs. Poultry with skin. Fried meats, poultry, eggs, or fish. Sausages, hot dogs, and deli  meats such as pastrami, bologna, or salami.  Dairy  Whole milk, cream, cheeses made from whole milk, sour cream. Ice cream or yogurt made from whole milk or with added sugar.  Beverages  For adults, no more than one alcoholic drink per day. Regular soft drinks or other sugary beverages. Juice drinks.  Sweets and Desserts  Sugary or fatty desserts, candy, and other sweets.  Fats and Oils  Solid shortening or partially hydrogenated oils. Solid margarine. Margarine that contains trans fats. Butter.  The items listed above may not be a complete list of foods and beverages to avoid. Contact your dietitian for more information.  This information is not intended to replace advice given to you by your health care provider. Make sure you discuss any questions you have with your health care provider.  Document Released: 01/06/2009 Document Revised: 05/25/2017 Document Reviewed: 11/26/2014  [employer] Interactive Patient Education © 2018 [employer]    Coronary Angiogram With Stent  Coronary angiogram with stent placement is a procedure to widen or open a narrow blood vessel of the heart (coronary artery). Arteries may become blocked by cholesterol buildup (plaques) in the lining of the wall. When a coronary artery becomes partially blocked, blood flow to that area decreases. This may lead to chest pain or a heart attack (myocardial infarction).  A stent is a small piece of metal that looks like mesh or a spring. Stent placement may be done as treatment for a heart attack or right after a coronary angiogram in which a blocked artery is found.  Let your health care provider know about:  · Any allergies you have.  · All medicines you are taking, including vitamins, herbs, eye drops, creams, and over-the-counter medicines.  · Any problems you or family members have had with anesthetic medicines.  · Any blood disorders you have.  · Any surgeries you have had.  · Any medical conditions you have.  · Whether you are pregnant or may  be pregnant.  What are the risks?  Generally, this is a safe procedure. However, problems may occur, including:  · Damage to the heart or its blood vessels.  · A return of blockage.  · Bleeding, infection, or bruising at the insertion site.  · A collection of blood under the skin (hematoma) at the insertion site.  · A blood clot in another part of the body.  · Kidney injury.  · Allergic reaction to the dye or contrast that is used.  · Bleeding into the abdomen (retroperitoneal bleeding).    What happens before the procedure?  Staying hydrated  Follow instructions from your health care provider about hydration, which may include:  · Up to 2 hours before the procedure - you may continue to drink clear liquids, such as water, clear fruit juice, black coffee, and plain tea.    Eating and drinking restrictions  Follow instructions from your health care provider about eating and drinking, which may include:  · 8 hours before the procedure - stop eating heavy meals or foods such as meat, fried foods, or fatty foods.  · 6 hours before the procedure - stop eating light meals or foods, such as toast or cereal.  · 2 hours before the procedure - stop drinking clear liquids.    Ask your health care provider about:  · Changing or stopping your regular medicines. This is especially important if you are taking diabetes medicines or blood thinners.  · Taking medicines such as ibuprofen. These medicines can thin your blood. Do not take these medicines before your procedure if your health care provider instructs you not to. Generally, aspirin is recommended before a procedure of passing a small, thin tube (catheter) through a blood vessel and into the heart (cardiac catheterization).    What happens during the procedure?  · An IV tube will be inserted into one of your veins.  · You will be given one or more of the following:  ? A medicine to help you relax (sedative).  ? A medicine to numb the area where the catheter will be inserted  into an artery (local anesthetic).  · To reduce your risk of infection:  ? Your health care team will wash or sanitize their hands.  ? Your skin will be washed with soap.  ? Hair may be removed from the area where the catheter will be inserted.  · Using a guide wire, the catheter will be inserted into an artery. The location may be in your groin, in your wrist, or in the fold of your arm (near your elbow).  · A type of X-ray (fluoroscopy) will be used to help guide the catheter to the opening of the arteries in the heart.  · A dye will be injected into the catheter, and X-rays will be taken. The dye will help to show where any narrowing or blockages are located in the arteries.  · A tiny wire will be guided to the blocked spot, and a balloon will be inflated to make the artery wider.  · The stent will be expanded and will crush the plaques into the wall of the vessel. The stent will hold the area open and improve the blood flow. Most stents have a drug coating to reduce the risk of the stent narrowing over time.  · The artery may be made wider using a drill, laser, or other tools to remove plaques.  · When the blood flow is better, the catheter will be removed. The lining of the artery will grow over the stent, which stays where it was placed.  This procedure may vary among health care providers and hospitals.  What happens after the procedure?  · If the procedure is done through the leg, you will be kept in bed lying flat for about 6 hours. You will be instructed to not bend and not cross your legs.  · The insertion site will be checked frequently.  · The pulse in your foot or wrist will be checked frequently.  · You may have additional blood tests, X-rays, and a test that records the electrical activity of your heart (electrocardiogram, or ECG).  This information is not intended to replace advice given to you by your health care provider. Make sure you discuss any questions you have with your health care  provider.  Document Released: 06/23/2004 Document Revised: 08/17/2017 Document Reviewed: 07/23/2017  [employer] Interactive Patient Education © 2018 [employer]

## 2018-06-27 LAB
ANION GAP SERPL CALCULATED.3IONS-SCNC: 8.3 MMOL/L (ref 3.6–11.2)
BUN BLD-MCNC: 11 MG/DL (ref 7–21)
BUN/CREAT SERPL: 12.4 (ref 7–25)
CALCIUM SPEC-SCNC: 9.6 MG/DL (ref 7.7–10)
CHLORIDE SERPL-SCNC: 103 MMOL/L (ref 99–112)
CK MB SERPL-CCNC: 0.2 NG/ML (ref 0–5)
CO2 SERPL-SCNC: 27.7 MMOL/L (ref 24.3–31.9)
CREAT BLD-MCNC: 0.89 MG/DL (ref 0.43–1.29)
GFR SERPL CREATININE-BSD FRML MDRD: 69 ML/MIN/1.73
GLUCOSE BLD-MCNC: 93 MG/DL (ref 70–110)
OSMOLALITY SERPL CALC.SUM OF ELEC: 276.6 MOSM/KG (ref 273–305)
POTASSIUM BLD-SCNC: 4 MMOL/L (ref 3.5–5.3)
SODIUM BLD-SCNC: 139 MMOL/L (ref 135–153)
TROPONIN I SERPL-MCNC: <0.006 NG/ML

## 2018-06-28 ENCOUNTER — TELEPHONE (OUTPATIENT)
Dept: CARDIOLOGY | Facility: CLINIC | Age: 43
End: 2018-06-28

## 2018-06-28 NOTE — TELEPHONE ENCOUNTER
Patient aware of lab results and to call our office with questions or concerns. [id number]/PAKO       ----- Message from JAXON Nelson sent at 6/27/2018  2:45 PM EDT -----  Please advise patient

## 2018-07-17 ENCOUNTER — TELEPHONE (OUTPATIENT)
Dept: CARDIOLOGY | Facility: CLINIC | Age: 43
End: 2018-07-17

## 2018-07-17 NOTE — TELEPHONE ENCOUNTER
Spoke with patient.  She is allergic to lobster only.  She found out from allergy testing, never eaten it.  She has had CT scans in the past with contrast without any complications.  Discussed with Dr. Newton, no need for pre-med.

## 2018-07-18 ENCOUNTER — OUTSIDE FACILITY SERVICE (OUTPATIENT)
Dept: CARDIOLOGY | Facility: CLINIC | Age: 43
End: 2018-07-18

## 2018-07-18 PROCEDURE — 93458 L HRT ARTERY/VENTRICLE ANGIO: CPT | Performed by: INTERNAL MEDICINE

## 2018-07-30 ENCOUNTER — OFFICE VISIT (OUTPATIENT)
Dept: CARDIOLOGY | Facility: CLINIC | Age: 43
End: 2018-07-30

## 2018-07-30 VITALS
BODY MASS INDEX: 41.01 KG/M2 | HEART RATE: 94 BPM | HEIGHT: 64 IN | OXYGEN SATURATION: 98 % | SYSTOLIC BLOOD PRESSURE: 141 MMHG | WEIGHT: 240.2 LBS | DIASTOLIC BLOOD PRESSURE: 97 MMHG

## 2018-07-30 DIAGNOSIS — G47.33 OSA ON CPAP: ICD-10-CM

## 2018-07-30 DIAGNOSIS — R00.2 PALPITATIONS: ICD-10-CM

## 2018-07-30 DIAGNOSIS — I10 ESSENTIAL HYPERTENSION: Primary | ICD-10-CM

## 2018-07-30 DIAGNOSIS — Z99.89 OSA ON CPAP: ICD-10-CM

## 2018-07-30 DIAGNOSIS — I25.10 CORONARY ARTERY DISEASE INVOLVING NATIVE CORONARY ARTERY OF NATIVE HEART WITHOUT ANGINA PECTORIS: ICD-10-CM

## 2018-07-30 DIAGNOSIS — R06.02 SHORTNESS OF BREATH: ICD-10-CM

## 2018-07-30 PROCEDURE — 99214 OFFICE O/P EST MOD 30 MIN: CPT | Performed by: NURSE PRACTITIONER

## 2018-07-30 RX ORDER — DILTIAZEM HYDROCHLORIDE 240 MG/1
240 CAPSULE, EXTENDED RELEASE ORAL DAILY
Qty: 90 CAPSULE | Refills: 3 | Status: SHIPPED | OUTPATIENT
Start: 2018-07-30 | End: 2018-09-25

## 2018-07-30 NOTE — PATIENT INSTRUCTIONS
"Fat and Cholesterol Restricted Diet  Getting too much fat and cholesterol in your diet may cause health problems. Following this diet helps keep your fat and cholesterol at normal levels. This can keep you from getting sick.  What types of fat should I choose?  · Choose monosaturated and polyunsaturated fats. These are found in foods such as olive oil, canola oil, flaxseeds, walnuts, almonds, and seeds.  · Eat more omega-3 fats. Good choices include salmon, mackerel, sardines, tuna, flaxseed oil, and ground flaxseeds.  · Limit saturated fats. These are in animal products such as meats, butter, and cream. They can also be in plant products such as palm oil, palm kernel oil, and coconut oil.  · Avoid foods with partially hydrogenated oils in them. These contain trans fats. Examples of foods that have trans fats are stick margarine, some tub margarines, cookies, crackers, and other baked goods.  What general guidelines do I need to follow?  · Check food labels. Look for the words \"trans fat\" and \"saturated fat.\"  · When preparing a meal:  ? Fill half of your plate with vegetables and green salads.  ? Fill one fourth of your plate with whole grains. Look for the word \"whole\" as the first word in the ingredient list.  ? Fill one fourth of your plate with lean protein foods.  · Eat more foods that have fiber, like apples, carrots, beans, peas, and barley.  · Eat more home-cooked foods. Eat less at restaurants and buffets.  · Limit or avoid alcohol.  · Limit foods high in starch and sugar.  · Limit fried foods.  · Cook foods without frying them. Baking, boiling, grilling, and broiling are all great options.  · Lose weight if you are overweight. Losing even a small amount of weight can help your overall health. It can also help prevent diseases such as diabetes and heart disease.  What foods can I eat?  Grains  Whole grains, such as whole wheat or whole grain breads, crackers, cereals, and pasta. Unsweetened oatmeal, " bulgur, barley, quinoa, or brown rice. Corn or whole wheat flour tortillas.  Vegetables  Fresh or frozen vegetables (raw, steamed, roasted, or grilled). Green salads.  Fruits  All fresh, canned (in natural juice), or frozen fruits.  Meat and Other Protein Products  Ground beef (85% or leaner), grass-fed beef, or beef trimmed of fat. Skinless chicken or turkey. Ground chicken or turkey. Pork trimmed of fat. All fish and seafood. Eggs. Dried beans, peas, or lentils. Unsalted nuts or seeds. Unsalted canned or dry beans.  Dairy  Low-fat dairy products, such as skim or 1% milk, 2% or reduced-fat cheeses, low-fat ricotta or cottage cheese, or plain low-fat yogurt.  Fats and Oils  Tub margarines without trans fats. Light or reduced-fat mayonnaise and salad dressings. Avocado. Olive, canola, sesame, or safflower oils. Natural peanut or almond butter (choose ones without added sugar and oil).  The items listed above may not be a complete list of recommended foods or beverages. Contact your dietitian for more options.  What foods are not recommended?  Grains  White bread. White pasta. White rice. Cornbread. Bagels, pastries, and croissants. Crackers that contain trans fat.  Vegetables  White potatoes. Corn. Creamed or fried vegetables. Vegetables in a cheese sauce.  Fruits  Dried fruits. Canned fruit in light or heavy syrup. Fruit juice.  Meat and Other Protein Products  Fatty cuts of meat. Ribs, chicken wings, guo, sausage, bologna, salami, chitterlings, fatback, hot dogs, bratwurst, and packaged luncheon meats. Liver and organ meats.  Dairy  Whole or 2% milk, cream, half-and-half, and cream cheese. Whole milk cheeses. Whole-fat or sweetened yogurt. Full-fat cheeses. Nondairy creamers and whipped toppings. Processed cheese, cheese spreads, or cheese curds.  Sweets and Desserts  Corn syrup, sugars, honey, and molasses. Candy. Jam and jelly. Syrup. Sweetened cereals. Cookies, pies, cakes, donuts, muffins, and ice  cream.  Fats and Oils  Butter, stick margarine, lard, shortening, ghee, or guo fat. Coconut, palm kernel, or palm oils.  Beverages  Alcohol. Sweetened drinks (such as sodas, lemonade, and fruit drinks or punches).  The items listed above may not be a complete list of foods and beverages to avoid. Contact your dietitian for more information.  This information is not intended to replace advice given to you by your health care provider. Make sure you discuss any questions you have with your health care provider.  Document Released: 06/18/2013 Document Revised: 08/24/2017 Document Reviewed: 03/19/2015  [employer] Interactive Patient Education © 2018 [employer]  BMI for Adults  Body mass index (BMI) is a number that is calculated from a person's weight and height. In most adults, the number is used to find how much of an adult's weight is made up of fat. BMI is not as accurate as a direct measure of body fat.  How is BMI calculated?  BMI is calculated by dividing weight in kilograms by height in meters squared. It can also be calculated by dividing weight in pounds by height in inches squared, then multiplying the resulting number by 703. Charts are available to help you find your BMI quickly and easily without doing this calculation.  How is BMI interpreted?  Health care professionals use BMI charts to identify whether an adult is underweight, at a normal weight, or overweight based on the following guidelines:  · Underweight: BMI less than 18.5.  · Normal weight: BMI between 18.5 and 24.9.  · Overweight: BMI between 25 and 29.9.  · Obese: BMI of 30 and above.    BMI is usually interpreted the same for males and females.  Weight includes both fat and muscle, so someone with a muscular build, such as an athlete, may have a BMI that is higher than 24.9. In cases like these, BMI may not accurately depict body fat. To determine if excess body fat is the cause of a BMI of 25 or higher, further assessments may need to be  "done by a health care provider.  Why is BMI a useful tool?  BMI is used to identify a possible weight problem that may be related to a medical problem or may increase the risk for medical problems. BMI can also be used to promote changes to reach a healthy weight.  This information is not intended to replace advice given to you by your health care provider. Make sure you discuss any questions you have with your health care provider.  Document Released: 08/29/2005 Document Revised: 04/27/2017 Document Reviewed: 05/15/2015  [employer] Interactive Patient Education © 2018 [employer]    Hypertension  Hypertension, commonly called high blood pressure, is when the force of blood pumping through the arteries is too strong. The arteries are the blood vessels that carry blood from the heart throughout the body. Hypertension forces the heart to work harder to pump blood and may cause arteries to become narrow or stiff. Having untreated or uncontrolled hypertension can cause heart attacks, strokes, kidney disease, and other problems.  A blood pressure reading consists of a higher number over a lower number. Ideally, your blood pressure should be below 120/80. The first (\"top\") number is called the systolic pressure. It is a measure of the pressure in your arteries as your heart beats. The second (\"bottom\") number is called the diastolic pressure. It is a measure of the pressure in your arteries as the heart relaxes.  What are the causes?  The cause of this condition is not known.  What increases the risk?  Some risk factors for high blood pressure are under your control. Others are not.  Factors you can change  · Smoking.  · Having type 2 diabetes mellitus, high cholesterol, or both.  · Not getting enough exercise or physical activity.  · Being overweight.  · Having too much fat, sugar, calories, or salt (sodium) in your diet.  · Drinking too much alcohol.  Factors that are difficult or impossible to change  · Having chronic " kidney disease.  · Having a family history of high blood pressure.  · Age. Risk increases with age.  · Race. You may be at higher risk if you are -American.  · Gender. Men are at higher risk than women before age 45. After age 65, women are at higher risk than men.  · Having obstructive sleep apnea.  · Stress.  What are the signs or symptoms?  Extremely high blood pressure (hypertensive crisis) may cause:  · Headache.  · Anxiety.  · Shortness of breath.  · Nosebleed.  · Nausea and vomiting.  · Severe chest pain.  · Jerky movements you cannot control (seizures).    How is this diagnosed?  This condition is diagnosed by measuring your blood pressure while you are seated, with your arm resting on a surface. The cuff of the blood pressure monitor will be placed directly against the skin of your upper arm at the level of your heart. It should be measured at least twice using the same arm. Certain conditions can cause a difference in blood pressure between your right and left arms.  Certain factors can cause blood pressure readings to be lower or higher than normal (elevated) for a short period of time:  · When your blood pressure is higher when you are in a health care provider's office than when you are at home, this is called white coat hypertension. Most people with this condition do not need medicines.  · When your blood pressure is higher at home than when you are in a health care provider's office, this is called masked hypertension. Most people with this condition may need medicines to control blood pressure.    If you have a high blood pressure reading during one visit or you have normal blood pressure with other risk factors:  · You may be asked to return on a different day to have your blood pressure checked again.  · You may be asked to monitor your blood pressure at home for 1 week or longer.    If you are diagnosed with hypertension, you may have other blood or imaging tests to help your health care  provider understand your overall risk for other conditions.  How is this treated?  This condition is treated by making healthy lifestyle changes, such as eating healthy foods, exercising more, and reducing your alcohol intake. Your health care provider may prescribe medicine if lifestyle changes are not enough to get your blood pressure under control, and if:  · Your systolic blood pressure is above 130.  · Your diastolic blood pressure is above 80.    Your personal target blood pressure may vary depending on your medical conditions, your age, and other factors.  Follow these instructions at home:  Eating and drinking  · Eat a diet that is high in fiber and potassium, and low in sodium, added sugar, and fat. An example eating plan is called the DASH (Dietary Approaches to Stop Hypertension) diet. To eat this way:  ? Eat plenty of fresh fruits and vegetables. Try to fill half of your plate at each meal with fruits and vegetables.  ? Eat whole grains, such as whole wheat pasta, brown rice, or whole grain bread. Fill about one quarter of your plate with whole grains.  ? Eat or drink low-fat dairy products, such as skim milk or low-fat yogurt.  ? Avoid fatty cuts of meat, processed or cured meats, and poultry with skin. Fill about one quarter of your plate with lean proteins, such as fish, chicken without skin, beans, eggs, and tofu.  ? Avoid premade and processed foods. These tend to be higher in sodium, added sugar, and fat.  · Reduce your daily sodium intake. Most people with hypertension should eat less than 1,500 mg of sodium a day.  · Limit alcohol intake to no more than 1 drink a day for nonpregnant women and 2 drinks a day for men. One drink equals 12 oz of beer, 5 oz of wine, or 1½ oz of hard liquor.  Lifestyle  · Work with your health care provider to maintain a healthy body weight or to lose weight. Ask what an ideal weight is for you.  · Get at least 30 minutes of exercise that causes your heart to beat  faster (aerobic exercise) most days of the week. Activities may include walking, swimming, or biking.  · Include exercise to strengthen your muscles (resistance exercise), such as pilates or lifting weights, as part of your weekly exercise routine. Try to do these types of exercises for 30 minutes at least 3 days a week.  · Do not use any products that contain nicotine or tobacco, such as cigarettes and e-cigarettes. If you need help quitting, ask your health care provider.  · Monitor your blood pressure at home as told by your health care provider.  · Keep all follow-up visits as told by your health care provider. This is important.  Medicines  · Take over-the-counter and prescription medicines only as told by your health care provider. Follow directions carefully. Blood pressure medicines must be taken as prescribed.  · Do not skip doses of blood pressure medicine. Doing this puts you at risk for problems and can make the medicine less effective.  · Ask your health care provider about side effects or reactions to medicines that you should watch for.  Contact a health care provider if:  · You think you are having a reaction to a medicine you are taking.  · You have headaches that keep coming back (recurring).  · You feel dizzy.  · You have swelling in your ankles.  · You have trouble with your vision.  Get help right away if:  · You develop a severe headache or confusion.  · You have unusual weakness or numbness.  · You feel faint.  · You have severe pain in your chest or abdomen.  · You vomit repeatedly.  · You have trouble breathing.  Summary  · Hypertension is when the force of blood pumping through your arteries is too strong. If this condition is not controlled, it may put you at risk for serious complications.  · Your personal target blood pressure may vary depending on your medical conditions, your age, and other factors. For most people, a normal blood pressure is less than 120/80.  · Hypertension is  treated with lifestyle changes, medicines, or a combination of both. Lifestyle changes include weight loss, eating a healthy, low-sodium diet, exercising more, and limiting alcohol.  This information is not intended to replace advice given to you by your health care provider. Make sure you discuss any questions you have with your health care provider.  Document Released: 12/18/2006 Document Revised: 11/15/2017 Document Reviewed: 11/15/2017  [employer] Interactive Patient Education © 2018 [employer]

## 2018-07-30 NOTE — PROGRESS NOTES
Subjective   Ellie Hardy is a 42 y.o. female     Chief Complaint   Patient presents with   • Follow-up     Presents for follow up after recent Cardiac Catheterization.    • Chest Pain   • Hypertension       HPI    Problem List:    1. Hypertension  2. CAD  2.1 Stress Test 3/28/17 - no ischemia; low risk   2.2 Stress Test 2/5/18 - no ischemia; preserved LVEF; inferobasal akinesis  2.3 left heart catheter 7/180/180-study excluded angiographically apparent epicardial coronary artery disease, she had no significant fixed stenosis in the LAD, EF 55%, left ventricular end-diastolic pressure was only 12  3. Shortness of breath  3.1 Echo 3/28/17 -  Mild to mod LVH; EF 55-60%; DD I; mild MR  3.2 Echo 2/5/18 - mild LVH; septal ridge at the base of septum without MAC or doppler evidence of LVOT obst; EF > 65%; DD I; trace MR  4. Palpitations   4.1 Event Monitor 3/13-3/26/17 - NSR - ST with PVCs/PACs  5. Fatigue  6. KEVYN - uses CPAP    Patient is a 42-year-old female who presents today for follow-up status post left heart catheter.  She denies any chest pain, pressure, palpitations, fluttering, dizziness, presyncope, syncope, nausea, PND or edema.  She does get short of breath with increased activity.    We went over left heart catheter.    Current Outpatient Prescriptions   Medication Sig Dispense Refill   • aspirin 81 MG tablet Take 1 tablet by mouth Daily. 30 tablet 11   • cholecalciferol (VITAMIN D3) 1000 units tablet Take 1,000 Units by mouth Daily.     • CloNIDine (CATAPRES) 0.1 MG tablet take 1 tablet by mouth three times a day if needed for HIGH 30 tablet 5   • diltiazem XR (DILACOR XR) 240 MG 24 hr capsule Take 1 capsule by mouth Daily. 90 capsule 3   • DULoxetine (CYMBALTA) 30 MG capsule Take 30 mg by mouth Daily.     • furosemide (LASIX) 20 MG tablet Take lasix daily; taken 2 tabs of lasix for 2 days then return to normal dose (Patient taking differently: Take 20 mg by mouth Daily.) 90 tablet 3   • nitroglycerin  (NITROSTAT) 0.4 MG SL tablet 1 under the tongue as needed for angina, may repeat q5mins for up three doses 30 tablet 5   • potassium chloride (K-DUR) 10 MEQ CR tablet Take 1 tablet when take 2 tablets of lasix only (Patient taking differently: Take 10 mEq by mouth Daily.) 15 tablet 5     No current facility-administered medications for this visit.        ALLERGIES    Other    Past Medical History:   Diagnosis Date   • Hypertension    • Sleep apnea     c-pap, followed by Dr. Loera       Social History     Social History   • Marital status:      Spouse name: N/A   • Number of children: N/A   • Years of education: N/A     Occupational History   • Not on file.     Social History Main Topics   • Smoking status: Never Smoker   • Smokeless tobacco: Never Used   • Alcohol use No   • Drug use: No   • Sexual activity: Defer     Other Topics Concern   • Not on file     Social History Narrative   • No narrative on file       Family History   Problem Relation Age of Onset   • Charcot-Lexy-Tooth disease Mother    • Heart disease Father        Review of Systems   Constitutional: Positive for fatigue. Negative for chills, diaphoresis and fever.   HENT: Negative for rhinorrhea and sneezing.    Eyes: Negative for visual disturbance.   Respiratory: Positive for shortness of breath (With increased activity). Negative for chest tightness.    Cardiovascular: Negative for chest pain, palpitations and leg swelling.   Gastrointestinal: Negative for abdominal pain, blood in stool, constipation, diarrhea, nausea and vomiting.   Endocrine: Negative.    Genitourinary: Negative for difficulty urinating and hematuria.   Musculoskeletal: Positive for arthralgias (Chronic), back pain (Chronic) and myalgias (To BLE). Negative for neck pain.   Skin: Negative.    Allergic/Immunologic: Positive for food allergies (Lobster). Negative for environmental allergies.   Neurological: Positive for headaches (When BP goes up). Negative for dizziness,  "syncope, weakness and light-headedness.   Hematological: Bruises/bleeds easily (Both).   Psychiatric/Behavioral: Positive for sleep disturbance (Does wake at night SOA- Uses CPap).       Objective   /97 (BP Location: Left arm, Patient Position: Sitting)   Pulse 94   Ht 162.6 cm (64\")   Wt 109 kg (240 lb 3.2 oz)   SpO2 98%   BMI 41.23 kg/m²   Vitals:    07/30/18 1534   BP: 141/97   BP Location: Left arm   Patient Position: Sitting   Pulse: 94   SpO2: 98%   Weight: 109 kg (240 lb 3.2 oz)   Height: 162.6 cm (64\")      Lab Results (most recent)     None        Physical Exam   Constitutional: She is oriented to person, place, and time. Vital signs are normal. She appears well-developed and well-nourished. She is active and cooperative.   HENT:   Head: Normocephalic.   Eyes: Lids are normal.   Neck: Normal carotid pulses, no hepatojugular reflux and no JVD present. Carotid bruit is not present.   Cardiovascular: Normal rate, regular rhythm and normal heart sounds.    Pulses:       Radial pulses are 2+ on the right side, and 2+ on the left side.        Dorsalis pedis pulses are 2+ on the right side, and 2+ on the left side.        Posterior tibial pulses are 2+ on the right side, and 2+ on the left side.   No edema BLE.    Pulmonary/Chest: Effort normal and breath sounds normal.   Abdominal: Normal appearance and bowel sounds are normal.   Neurological: She is alert and oriented to person, place, and time.   Skin: Skin is warm, dry and intact.   Psychiatric: She has a normal mood and affect. Her speech is normal and behavior is normal. Thought content normal. Cognition and memory are normal.       Procedure   Procedures         Assessment/Plan      Diagnosis Plan   1. Essential hypertension  diltiazem XR (DILACOR XR) 240 MG 24 hr capsule   2. Shortness of breath     3. Palpitations  diltiazem XR (DILACOR XR) 240 MG 24 hr capsule   4. KEVYN on CPAP     5. Coronary artery disease involving native coronary artery of " native heart without angina pectoris         Return in about 8 weeks (around 9/24/2018).       hypertension-patient will increase her diltiazem to 240.  Shortness of breath-stable.  Palpitations-resolved.  KEVYN-patient is compliant with CPAP.  She will continue her medication regimen otherwise.  She will monitor blood pressure and heart rate and bring to follow-up appointment.  She will follow-up in 8 weeks or sooner if any changes.        Patient's Body mass index is 41.23 kg/m². BMI is above normal parameters. Recommendations include: educational material.      Electronically signed by:

## 2018-09-25 ENCOUNTER — OFFICE VISIT (OUTPATIENT)
Dept: CARDIOLOGY | Facility: CLINIC | Age: 43
End: 2018-09-25

## 2018-09-25 VITALS
HEIGHT: 64 IN | OXYGEN SATURATION: 95 % | SYSTOLIC BLOOD PRESSURE: 148 MMHG | DIASTOLIC BLOOD PRESSURE: 95 MMHG | HEART RATE: 85 BPM | BODY MASS INDEX: 40.84 KG/M2 | WEIGHT: 239.2 LBS

## 2018-09-25 DIAGNOSIS — Z99.89 OSA ON CPAP: ICD-10-CM

## 2018-09-25 DIAGNOSIS — G47.33 OSA ON CPAP: ICD-10-CM

## 2018-09-25 DIAGNOSIS — I10 ESSENTIAL HYPERTENSION: ICD-10-CM

## 2018-09-25 DIAGNOSIS — I25.10 CORONARY ARTERY DISEASE INVOLVING NATIVE CORONARY ARTERY OF NATIVE HEART WITHOUT ANGINA PECTORIS: Primary | ICD-10-CM

## 2018-09-25 DIAGNOSIS — R00.2 PALPITATIONS: ICD-10-CM

## 2018-09-25 PROCEDURE — 99214 OFFICE O/P EST MOD 30 MIN: CPT | Performed by: NURSE PRACTITIONER

## 2018-09-25 RX ORDER — METOPROLOL SUCCINATE 50 MG/1
50 TABLET, EXTENDED RELEASE ORAL DAILY
Qty: 30 TABLET | Refills: 11 | Status: SHIPPED | OUTPATIENT
Start: 2018-09-25 | End: 2019-05-29 | Stop reason: SDUPTHER

## 2018-09-25 RX ORDER — BROMPHENIRAMINE MALEATE, PSEUDOEPHEDRINE HYDROCHLORIDE, AND DEXTROMETHORPHAN HYDROBROMIDE 2; 30; 10 MG/5ML; MG/5ML; MG/5ML
10 SYRUP ORAL EVERY 4 HOURS
COMMUNITY
End: 2019-12-03

## 2018-09-25 RX ORDER — LISINOPRIL AND HYDROCHLOROTHIAZIDE 12.5; 1 MG/1; MG/1
1 TABLET ORAL DAILY
COMMUNITY
End: 2019-05-29 | Stop reason: SDUPTHER

## 2018-09-25 NOTE — PATIENT INSTRUCTIONS
"Fat and Cholesterol Restricted Diet  Getting too much fat and cholesterol in your diet may cause health problems. Following this diet helps keep your fat and cholesterol at normal levels. This can keep you from getting sick.  What types of fat should I choose?  · Choose monosaturated and polyunsaturated fats. These are found in foods such as olive oil, canola oil, flaxseeds, walnuts, almonds, and seeds.  · Eat more omega-3 fats. Good choices include salmon, mackerel, sardines, tuna, flaxseed oil, and ground flaxseeds.  · Limit saturated fats. These are in animal products such as meats, butter, and cream. They can also be in plant products such as palm oil, palm kernel oil, and coconut oil.  · Avoid foods with partially hydrogenated oils in them. These contain trans fats. Examples of foods that have trans fats are stick margarine, some tub margarines, cookies, crackers, and other baked goods.  What general guidelines do I need to follow?  · Check food labels. Look for the words \"trans fat\" and \"saturated fat.\"  · When preparing a meal:  ? Fill half of your plate with vegetables and green salads.  ? Fill one fourth of your plate with whole grains. Look for the word \"whole\" as the first word in the ingredient list.  ? Fill one fourth of your plate with lean protein foods.  · Eat more foods that have fiber, like apples, carrots, beans, peas, and barley.  · Eat more home-cooked foods. Eat less at restaurants and buffets.  · Limit or avoid alcohol.  · Limit foods high in starch and sugar.  · Limit fried foods.  · Cook foods without frying them. Baking, boiling, grilling, and broiling are all great options.  · Lose weight if you are overweight. Losing even a small amount of weight can help your overall health. It can also help prevent diseases such as diabetes and heart disease.  What foods can I eat?  Grains  Whole grains, such as whole wheat or whole grain breads, crackers, cereals, and pasta. Unsweetened oatmeal, " bulgur, barley, quinoa, or brown rice. Corn or whole wheat flour tortillas.  Vegetables  Fresh or frozen vegetables (raw, steamed, roasted, or grilled). Green salads.  Fruits  All fresh, canned (in natural juice), or frozen fruits.  Meat and Other Protein Products  Ground beef (85% or leaner), grass-fed beef, or beef trimmed of fat. Skinless chicken or turkey. Ground chicken or turkey. Pork trimmed of fat. All fish and seafood. Eggs. Dried beans, peas, or lentils. Unsalted nuts or seeds. Unsalted canned or dry beans.  Dairy  Low-fat dairy products, such as skim or 1% milk, 2% or reduced-fat cheeses, low-fat ricotta or cottage cheese, or plain low-fat yogurt.  Fats and Oils  Tub margarines without trans fats. Light or reduced-fat mayonnaise and salad dressings. Avocado. Olive, canola, sesame, or safflower oils. Natural peanut or almond butter (choose ones without added sugar and oil).  The items listed above may not be a complete list of recommended foods or beverages. Contact your dietitian for more options.  What foods are not recommended?  Grains  White bread. White pasta. White rice. Cornbread. Bagels, pastries, and croissants. Crackers that contain trans fat.  Vegetables  White potatoes. Corn. Creamed or fried vegetables. Vegetables in a cheese sauce.  Fruits  Dried fruits. Canned fruit in light or heavy syrup. Fruit juice.  Meat and Other Protein Products  Fatty cuts of meat. Ribs, chicken wings, guo, sausage, bologna, salami, chitterlings, fatback, hot dogs, bratwurst, and packaged luncheon meats. Liver and organ meats.  Dairy  Whole or 2% milk, cream, half-and-half, and cream cheese. Whole milk cheeses. Whole-fat or sweetened yogurt. Full-fat cheeses. Nondairy creamers and whipped toppings. Processed cheese, cheese spreads, or cheese curds.  Sweets and Desserts  Corn syrup, sugars, honey, and molasses. Candy. Jam and jelly. Syrup. Sweetened cereals. Cookies, pies, cakes, donuts, muffins, and ice  cream.  Fats and Oils  Butter, stick margarine, lard, shortening, ghee, or guo fat. Coconut, palm kernel, or palm oils.  Beverages  Alcohol. Sweetened drinks (such as sodas, lemonade, and fruit drinks or punches).  The items listed above may not be a complete list of foods and beverages to avoid. Contact your dietitian for more information.  This information is not intended to replace advice given to you by your health care provider. Make sure you discuss any questions you have with your health care provider.  Document Released: 06/18/2013 Document Revised: 08/24/2017 Document Reviewed: 03/19/2015  [employer] Interactive Patient Education © 2018 [employer]  BMI for Adults  Body mass index (BMI) is a number that is calculated from a person's weight and height. In most adults, the number is used to find how much of an adult's weight is made up of fat. BMI is not as accurate as a direct measure of body fat.  How is BMI calculated?  BMI is calculated by dividing weight in kilograms by height in meters squared. It can also be calculated by dividing weight in pounds by height in inches squared, then multiplying the resulting number by 703. Charts are available to help you find your BMI quickly and easily without doing this calculation.  How is BMI interpreted?  Health care professionals use BMI charts to identify whether an adult is underweight, at a normal weight, or overweight based on the following guidelines:  · Underweight: BMI less than 18.5.  · Normal weight: BMI between 18.5 and 24.9.  · Overweight: BMI between 25 and 29.9.  · Obese: BMI of 30 and above.    BMI is usually interpreted the same for males and females.  Weight includes both fat and muscle, so someone with a muscular build, such as an athlete, may have a BMI that is higher than 24.9. In cases like these, BMI may not accurately depict body fat. To determine if excess body fat is the cause of a BMI of 25 or higher, further assessments may need to be  done by a health care provider.  Why is BMI a useful tool?  BMI is used to identify a possible weight problem that may be related to a medical problem or may increase the risk for medical problems. BMI can also be used to promote changes to reach a healthy weight.  This information is not intended to replace advice given to you by your health care provider. Make sure you discuss any questions you have with your health care provider.  Document Released: 08/29/2005 Document Revised: 04/27/2017 Document Reviewed: 05/15/2015  [employer] Interactive Patient Education © 2018 [employer]    Premature Ventricular Contraction  A premature ventricular contraction (PVC) is a common irregularity in the normal heart rhythm. These contractions are extra heartbeats that start in the heart ventricles and occur too early in the normal sequence. During the PVC, the heart's normal electrical pathway is not used, so the beat is shorter and less effective. In most cases, these contractions come and go and do not require treatment.  What are the causes?  In many cases, the cause may not be known. Common causes of the condition include:  · Smoking.  · Drinking alcohol.  · Caffeine.  · Certain medicines.  · Some illegal drugs.  · Stress.    Certain medical conditions can also cause PVCs:  · Changes in minerals in the blood (electrolytes).  · Heart failure.  · Heart valve problems.  · Low blood oxygen levels or high carbon dioxide levels.  · Heart attack, or coronary artery disease.    What are the signs or symptoms?  The main symptom of this condition is a fast or skipped heartbeat (palpitations). Other symptoms include:  · Chest pain.  · Shortness of breath.  · Feeling tired.  · Dizziness.    In some cases, there are no symptoms.  How is this diagnosed?  This condition may be diagnosed based on:  · Your medical history.  · A physical exam. During the exam, the health care provider will check for irregular heartbeats.  · Tests, such  as:  ? An ECG (electrocardiogram) to monitor the electrical activity of your heart.  ? Holter monitor testing. This involves wearing a device that clips to your clothing and monitors the electrical activity of your heart over longer periods of time.  ? Stress tests to see how exercise affects your heart rhythm and blood supply.  ? Echocardiogram. This test uses sound waves (ultrasound) to produce an image of your heart.  ? Electrophysiology study. This test checks the electric pathways in your heart.    How is this treated?  Treatment depends on any underlying conditions, the type of PVCs that you are having, and how much the symptoms are interfering with your daily life.  Possible treatments include:  · Avoiding things that can trigger the premature contractions, such as caffeine or alcohol.  · Medicines. These may be given if symptoms are severe or if the extra heartbeats are frequent.  · Treatment for any underlying condition that is found to be the cause of the contractions.  · Catheter ablation. This procedure destroys the heart tissues that send abnormal signals.    In some cases, no treatment is required.  Follow these instructions at home:  Lifestyle  Follow these instructions as told by your health care provider:  · Do not use any products that contain nicotine or tobacco, such as cigarettes and e-cigarettes. If you need help quitting, ask your health care provider.  · If caffeine triggers episodes of PVC, do not eat, drink, or use anything with caffeine in it.  · If caffeine does not seem to trigger episodes, consume caffeine in moderation.  · If alcohol triggers episodes of PVC, do not drink alcohol.  · If alcohol does not seem to trigger episodes, limit alcohol intake to no more than 1 drink a day for nonpregnant women and 2 drinks a day for men. One drink equals 12 oz of beer, 5 oz of wine, or 1½ oz of hard liquor.  · Exercise regularly. Ask your health care provider what type of exercise is safe for  you.  · Find healthy ways to manage stress. Avoid stressful situations when possible.  · Try to get at least 7-9 hours of sleep each night, or as much as recommended by your health care provider.  · Do not use illegal drugs.    General instructions  · Take over-the-counter and prescription medicines only as told by your health care provider.  · Keep all follow-up visits as told by your health care provider. This is important.  Get help right away if:  · You feel palpitations that are frequent or continual.  · You have chest pain.  · You have shortness of breath.  · You have sweating for no reason.  · You have nausea and vomiting.  · You become light-headed or you faint.  This information is not intended to replace advice given to you by your health care provider. Make sure you discuss any questions you have with your health care provider.  Document Released: 08/04/2005 Document Revised: 08/11/2017 Document Reviewed: 05/24/2017  [employer] Interactive Patient Education © 2018 [employer]

## 2018-09-25 NOTE — PROGRESS NOTES
Subjective   Ellie Hardy is a 42 y.o. female     Chief Complaint   Patient presents with   • Follow-up     Pt in for 8 week F/U appt   • Hypertension       HPI    Problem List:    1. Hypertension  2. CAD  2.1 Stress Test 3/28/17 - no ischemia; low risk   2.2 Stress Test 2/5/18 - no ischemia; preserved LVEF; inferobasal akinesis  2.3 left heart catheter 7/180/18-study excluded angiographically apparent epicardial coronary artery disease, she had no significant fixed stenosis in the LAD, EF 55%, left ventricular end-diastolic pressure was only 12  3. Shortness of breath  3.1 Echo 3/28/17 -  Mild to mod LVH; EF 55-60%; DD I; mild MR  3.2 Echo 2/5/18 - mild LVH; septal ridge at the base of septum without MAC or doppler evidence of LVOT obst; EF > 65%; DD I; trace MR  4. Palpitations   4.1 Event Monitor 3/13-3/26/17 - NSR - ST with PVCs/PACs  5. Fatigue  6. KEVYN - uses CPAP    Patient is a 42-year-old female who presents today for follow-up with her  at her side.  She denies any chest pain or pressure.  She says her palpitations have been much more frequently here in the recent past.  She did though advise me after the fact that she had gotten a steroid injection due to recent strep throat which was approximately a week ago.  She denies any dizziness, presyncope, syncope, orthopnea, PND or edema.  She denies any shortness of breath with activity.  Patient's blood pressure is still elevated however she brought her blood pressure readings from home and they are very good.  Her heart rate tends to run on the higher side even with the diltiazem 240.      Current Outpatient Prescriptions   Medication Sig Dispense Refill   • aspirin 81 MG tablet Take 1 tablet by mouth Daily. 30 tablet 11   • brompheniramine-pseudoephedrine-DM (BROMFED DM) 30-2-10 MG/5ML syrup 10 mL Every 4 (Four) Hours.     • cholecalciferol (VITAMIN D3) 1000 units tablet Take 1,000 Units by mouth Daily.     • CloNIDine (CATAPRES) 0.1 MG tablet take  1 tablet by mouth three times a day if needed for HIGH 30 tablet 5   • DULoxetine (CYMBALTA) 30 MG capsule Take 30 mg by mouth Daily.     • furosemide (LASIX) 20 MG tablet Take lasix daily; taken 2 tabs of lasix for 2 days then return to normal dose (Patient taking differently: Take 20 mg by mouth Daily.) 90 tablet 3   • lisinopril-hydrochlorothiazide (PRINZIDE,ZESTORETIC) 10-12.5 MG per tablet Take 1 tablet by mouth Daily.     • nitroglycerin (NITROSTAT) 0.4 MG SL tablet 1 under the tongue as needed for angina, may repeat q5mins for up three doses 30 tablet 5   • potassium chloride (K-DUR) 10 MEQ CR tablet Take 1 tablet when take 2 tablets of lasix only (Patient taking differently: Take 10 mEq by mouth Daily.) 15 tablet 5   • metoprolol succinate XL (TOPROL-XL) 50 MG 24 hr tablet Take 1 tablet by mouth Daily. 30 tablet 11     No current facility-administered medications for this visit.        ALLERGIES    Other    Past Medical History:   Diagnosis Date   • Hypertension    • Sleep apnea     c-pap, followed by Dr. Loera       Social History     Social History   • Marital status:      Spouse name: N/A   • Number of children: N/A   • Years of education: N/A     Occupational History   • Not on file.     Social History Main Topics   • Smoking status: Never Smoker   • Smokeless tobacco: Never Used   • Alcohol use No   • Drug use: No   • Sexual activity: Defer     Other Topics Concern   • Not on file     Social History Narrative   • No narrative on file       Family History   Problem Relation Age of Onset   • Charcot-Lexy-Tooth disease Mother    • Heart disease Father        Review of Systems   Constitutional: Positive for fatigue (Pt is recovering from Strep, has been easily fatigued. ).   HENT: Positive for congestion (strep throat recently ). Negative for rhinorrhea.    Eyes: Negative for visual disturbance.   Respiratory: Positive for apnea (Pt uses CPAP at HS) and cough (strep throat recently ). Negative for  "chest tightness and shortness of breath.    Cardiovascular: Positive for palpitations (Pt states that palpitations haven't been that frequent for the past few weeks. But it has been more frequent since last visit. ). Negative for chest pain and leg swelling.   Gastrointestinal: Negative for abdominal pain, nausea and vomiting.   Endocrine: Positive for heat intolerance (Heat makes pt's sx worse). Negative for cold intolerance.   Genitourinary: Negative for difficulty urinating, frequency and urgency.   Musculoskeletal: Negative for back pain and neck pain.   Skin: Negative for rash and wound.   Allergic/Immunologic: Positive for food allergies (Lobster). Negative for environmental allergies.   Neurological: Positive for weakness (Pt states she gets easily weak and fatigued, even after strep) and headaches (Some HA over the past few months; has some sinus problems, recent strep ). Negative for dizziness and light-headedness.   Hematological: Does not bruise/bleed easily.   Psychiatric/Behavioral: Negative for agitation and confusion. The patient is nervous/anxious (Easily nervous).        Objective   /95 (BP Location: Left arm, Patient Position: Sitting)   Pulse 85   Ht 162.6 cm (64\")   Wt 109 kg (239 lb 3.2 oz)   SpO2 95%   BMI 41.06 kg/m²   Vitals:    09/25/18 1457   BP: 148/95   BP Location: Left arm   Patient Position: Sitting   Pulse: 85   SpO2: 95%   Weight: 109 kg (239 lb 3.2 oz)   Height: 162.6 cm (64\")      Lab Results (most recent)     None        Physical Exam   Constitutional: She is oriented to person, place, and time. Vital signs are normal. She appears well-developed and well-nourished. She is active and cooperative.   HENT:   Head: Normocephalic.   Eyes: Lids are normal.   Neck: Normal carotid pulses, no hepatojugular reflux and no JVD present. Carotid bruit is not present.   Cardiovascular: Normal rate, regular rhythm and normal heart sounds.    Pulses:       Radial pulses are 2+ on the " right side, and 2+ on the left side.        Dorsalis pedis pulses are 2+ on the right side, and 2+ on the left side.        Posterior tibial pulses are 2+ on the right side, and 2+ on the left side.   No edema BLE.   Pulmonary/Chest: Effort normal and breath sounds normal.   Abdominal: Normal appearance and bowel sounds are normal.   Neurological: She is alert and oriented to person, place, and time.   Skin: Skin is warm, dry and intact.   Psychiatric: She has a normal mood and affect. Her speech is normal and behavior is normal. Judgment and thought content normal. Cognition and memory are normal.       Procedure   Procedures         Assessment/Plan      Diagnosis Plan   1. Coronary artery disease involving native coronary artery of native heart without angina pectoris     2. Essential hypertension  metoprolol succinate XL (TOPROL-XL) 50 MG 24 hr tablet   3. Palpitations  metoprolol succinate XL (TOPROL-XL) 50 MG 24 hr tablet   4. KEVYN on CPAP         Return in about 8 weeks (around 11/20/2018).       CAD-patient is on aspirin and now beta.  Hypertension/palpitations-we will stop diltiazem and start metoprolol 50 mg once a day.  KEVYN-patient is compliant with CPAP.  She will continue her medication regimen otherwise.  She will monitor blood pressure and heart rate.  She will follow-up in 8 weeks or sooner if any changes.    Patient's Body mass index is 41.06 kg/m². BMI is above normal parameters. Recommendations include: educational material.      Electronically signed by:

## 2018-11-27 ENCOUNTER — OFFICE VISIT (OUTPATIENT)
Dept: CARDIOLOGY | Facility: CLINIC | Age: 43
End: 2018-11-27

## 2018-11-27 VITALS
WEIGHT: 243.8 LBS | HEART RATE: 77 BPM | HEIGHT: 64 IN | DIASTOLIC BLOOD PRESSURE: 85 MMHG | SYSTOLIC BLOOD PRESSURE: 138 MMHG | BODY MASS INDEX: 41.62 KG/M2 | OXYGEN SATURATION: 99 %

## 2018-11-27 DIAGNOSIS — I25.10 CORONARY ARTERY DISEASE INVOLVING NATIVE CORONARY ARTERY OF NATIVE HEART WITHOUT ANGINA PECTORIS: Primary | ICD-10-CM

## 2018-11-27 DIAGNOSIS — Z99.89 OSA ON CPAP: ICD-10-CM

## 2018-11-27 DIAGNOSIS — R06.02 SHORTNESS OF BREATH: ICD-10-CM

## 2018-11-27 DIAGNOSIS — G47.33 OSA ON CPAP: ICD-10-CM

## 2018-11-27 DIAGNOSIS — I10 ESSENTIAL HYPERTENSION: ICD-10-CM

## 2018-11-27 PROCEDURE — 99213 OFFICE O/P EST LOW 20 MIN: CPT | Performed by: NURSE PRACTITIONER

## 2018-11-27 NOTE — PATIENT INSTRUCTIONS
"Fat and Cholesterol Restricted Diet  Getting too much fat and cholesterol in your diet may cause health problems. Following this diet helps keep your fat and cholesterol at normal levels. This can keep you from getting sick.  What types of fat should I choose?  · Choose monosaturated and polyunsaturated fats. These are found in foods such as olive oil, canola oil, flaxseeds, walnuts, almonds, and seeds.  · Eat more omega-3 fats. Good choices include salmon, mackerel, sardines, tuna, flaxseed oil, and ground flaxseeds.  · Limit saturated fats. These are in animal products such as meats, butter, and cream. They can also be in plant products such as palm oil, palm kernel oil, and coconut oil.  · Avoid foods with partially hydrogenated oils in them. These contain trans fats. Examples of foods that have trans fats are stick margarine, some tub margarines, cookies, crackers, and other baked goods.  What general guidelines do I need to follow?  · Check food labels. Look for the words \"trans fat\" and \"saturated fat.\"  · When preparing a meal:  ? Fill half of your plate with vegetables and green salads.  ? Fill one fourth of your plate with whole grains. Look for the word \"whole\" as the first word in the ingredient list.  ? Fill one fourth of your plate with lean protein foods.  · Eat more foods that have fiber, like apples, carrots, beans, peas, and barley.  · Eat more home-cooked foods. Eat less at restaurants and buffets.  · Limit or avoid alcohol.  · Limit foods high in starch and sugar.  · Limit fried foods.  · Cook foods without frying them. Baking, boiling, grilling, and broiling are all great options.  · Lose weight if you are overweight. Losing even a small amount of weight can help your overall health. It can also help prevent diseases such as diabetes and heart disease.  What foods can I eat?  Grains  Whole grains, such as whole wheat or whole grain breads, crackers, cereals, and pasta. Unsweetened oatmeal, " bulgur, barley, quinoa, or brown rice. Corn or whole wheat flour tortillas.  Vegetables  Fresh or frozen vegetables (raw, steamed, roasted, or grilled). Green salads.  Fruits  All fresh, canned (in natural juice), or frozen fruits.  Meat and Other Protein Products  Ground beef (85% or leaner), grass-fed beef, or beef trimmed of fat. Skinless chicken or turkey. Ground chicken or turkey. Pork trimmed of fat. All fish and seafood. Eggs. Dried beans, peas, or lentils. Unsalted nuts or seeds. Unsalted canned or dry beans.  Dairy  Low-fat dairy products, such as skim or 1% milk, 2% or reduced-fat cheeses, low-fat ricotta or cottage cheese, or plain low-fat yogurt.  Fats and Oils  Tub margarines without trans fats. Light or reduced-fat mayonnaise and salad dressings. Avocado. Olive, canola, sesame, or safflower oils. Natural peanut or almond butter (choose ones without added sugar and oil).  The items listed above may not be a complete list of recommended foods or beverages. Contact your dietitian for more options.  What foods are not recommended?  Grains  White bread. White pasta. White rice. Cornbread. Bagels, pastries, and croissants. Crackers that contain trans fat.  Vegetables  White potatoes. Corn. Creamed or fried vegetables. Vegetables in a cheese sauce.  Fruits  Dried fruits. Canned fruit in light or heavy syrup. Fruit juice.  Meat and Other Protein Products  Fatty cuts of meat. Ribs, chicken wings, guo, sausage, bologna, salami, chitterlings, fatback, hot dogs, bratwurst, and packaged luncheon meats. Liver and organ meats.  Dairy  Whole or 2% milk, cream, half-and-half, and cream cheese. Whole milk cheeses. Whole-fat or sweetened yogurt. Full-fat cheeses. Nondairy creamers and whipped toppings. Processed cheese, cheese spreads, or cheese curds.  Sweets and Desserts  Corn syrup, sugars, honey, and molasses. Candy. Jam and jelly. Syrup. Sweetened cereals. Cookies, pies, cakes, donuts, muffins, and ice  cream.  Fats and Oils  Butter, stick margarine, lard, shortening, ghee, or guo fat. Coconut, palm kernel, or palm oils.  Beverages  Alcohol. Sweetened drinks (such as sodas, lemonade, and fruit drinks or punches).  The items listed above may not be a complete list of foods and beverages to avoid. Contact your dietitian for more information.  This information is not intended to replace advice given to you by your health care provider. Make sure you discuss any questions you have with your health care provider.  Document Released: 06/18/2013 Document Revised: 08/24/2017 Document Reviewed: 03/19/2015  [employer] Interactive Patient Education © 2018 [employer]  BMI for Adults  Body mass index (BMI) is a number that is calculated from a person's weight and height. In most adults, the number is used to find how much of an adult's weight is made up of fat. BMI is not as accurate as a direct measure of body fat.  How is BMI calculated?  BMI is calculated by dividing weight in kilograms by height in meters squared. It can also be calculated by dividing weight in pounds by height in inches squared, then multiplying the resulting number by 703. Charts are available to help you find your BMI quickly and easily without doing this calculation.  How is BMI interpreted?  Health care professionals use BMI charts to identify whether an adult is underweight, at a normal weight, or overweight based on the following guidelines:  · Underweight: BMI less than 18.5.  · Normal weight: BMI between 18.5 and 24.9.  · Overweight: BMI between 25 and 29.9.  · Obese: BMI of 30 and above.    BMI is usually interpreted the same for males and females.  Weight includes both fat and muscle, so someone with a muscular build, such as an athlete, may have a BMI that is higher than 24.9. In cases like these, BMI may not accurately depict body fat. To determine if excess body fat is the cause of a BMI of 25 or higher, further assessments may need to be  "done by a health care provider.  Why is BMI a useful tool?  BMI is used to identify a possible weight problem that may be related to a medical problem or may increase the risk for medical problems. BMI can also be used to promote changes to reach a healthy weight.  This information is not intended to replace advice given to you by your health care provider. Make sure you discuss any questions you have with your health care provider.  Document Released: 08/29/2005 Document Revised: 04/27/2017 Document Reviewed: 05/15/2015  [employer] Interactive Patient Education © 2018 [employer]    Hypertension  Hypertension, commonly called high blood pressure, is when the force of blood pumping through the arteries is too strong. The arteries are the blood vessels that carry blood from the heart throughout the body. Hypertension forces the heart to work harder to pump blood and may cause arteries to become narrow or stiff. Having untreated or uncontrolled hypertension can cause heart attacks, strokes, kidney disease, and other problems.  A blood pressure reading consists of a higher number over a lower number. Ideally, your blood pressure should be below 120/80. The first (\"top\") number is called the systolic pressure. It is a measure of the pressure in your arteries as your heart beats. The second (\"bottom\") number is called the diastolic pressure. It is a measure of the pressure in your arteries as the heart relaxes.  What are the causes?  The cause of this condition is not known.  What increases the risk?  Some risk factors for high blood pressure are under your control. Others are not.  Factors you can change  · Smoking.  · Having type 2 diabetes mellitus, high cholesterol, or both.  · Not getting enough exercise or physical activity.  · Being overweight.  · Having too much fat, sugar, calories, or salt (sodium) in your diet.  · Drinking too much alcohol.  Factors that are difficult or impossible to change  · Having chronic " kidney disease.  · Having a family history of high blood pressure.  · Age. Risk increases with age.  · Race. You may be at higher risk if you are -American.  · Gender. Men are at higher risk than women before age 45. After age 65, women are at higher risk than men.  · Having obstructive sleep apnea.  · Stress.  What are the signs or symptoms?  Extremely high blood pressure (hypertensive crisis) may cause:  · Headache.  · Anxiety.  · Shortness of breath.  · Nosebleed.  · Nausea and vomiting.  · Severe chest pain.  · Jerky movements you cannot control (seizures).    How is this diagnosed?  This condition is diagnosed by measuring your blood pressure while you are seated, with your arm resting on a surface. The cuff of the blood pressure monitor will be placed directly against the skin of your upper arm at the level of your heart. It should be measured at least twice using the same arm. Certain conditions can cause a difference in blood pressure between your right and left arms.  Certain factors can cause blood pressure readings to be lower or higher than normal (elevated) for a short period of time:  · When your blood pressure is higher when you are in a health care provider's office than when you are at home, this is called white coat hypertension. Most people with this condition do not need medicines.  · When your blood pressure is higher at home than when you are in a health care provider's office, this is called masked hypertension. Most people with this condition may need medicines to control blood pressure.    If you have a high blood pressure reading during one visit or you have normal blood pressure with other risk factors:  · You may be asked to return on a different day to have your blood pressure checked again.  · You may be asked to monitor your blood pressure at home for 1 week or longer.    If you are diagnosed with hypertension, you may have other blood or imaging tests to help your health care  provider understand your overall risk for other conditions.  How is this treated?  This condition is treated by making healthy lifestyle changes, such as eating healthy foods, exercising more, and reducing your alcohol intake. Your health care provider may prescribe medicine if lifestyle changes are not enough to get your blood pressure under control, and if:  · Your systolic blood pressure is above 130.  · Your diastolic blood pressure is above 80.    Your personal target blood pressure may vary depending on your medical conditions, your age, and other factors.  Follow these instructions at home:  Eating and drinking  · Eat a diet that is high in fiber and potassium, and low in sodium, added sugar, and fat. An example eating plan is called the DASH (Dietary Approaches to Stop Hypertension) diet. To eat this way:  ? Eat plenty of fresh fruits and vegetables. Try to fill half of your plate at each meal with fruits and vegetables.  ? Eat whole grains, such as whole wheat pasta, brown rice, or whole grain bread. Fill about one quarter of your plate with whole grains.  ? Eat or drink low-fat dairy products, such as skim milk or low-fat yogurt.  ? Avoid fatty cuts of meat, processed or cured meats, and poultry with skin. Fill about one quarter of your plate with lean proteins, such as fish, chicken without skin, beans, eggs, and tofu.  ? Avoid premade and processed foods. These tend to be higher in sodium, added sugar, and fat.  · Reduce your daily sodium intake. Most people with hypertension should eat less than 1,500 mg of sodium a day.  · Limit alcohol intake to no more than 1 drink a day for nonpregnant women and 2 drinks a day for men. One drink equals 12 oz of beer, 5 oz of wine, or 1½ oz of hard liquor.  Lifestyle  · Work with your health care provider to maintain a healthy body weight or to lose weight. Ask what an ideal weight is for you.  · Get at least 30 minutes of exercise that causes your heart to beat  faster (aerobic exercise) most days of the week. Activities may include walking, swimming, or biking.  · Include exercise to strengthen your muscles (resistance exercise), such as pilates or lifting weights, as part of your weekly exercise routine. Try to do these types of exercises for 30 minutes at least 3 days a week.  · Do not use any products that contain nicotine or tobacco, such as cigarettes and e-cigarettes. If you need help quitting, ask your health care provider.  · Monitor your blood pressure at home as told by your health care provider.  · Keep all follow-up visits as told by your health care provider. This is important.  Medicines  · Take over-the-counter and prescription medicines only as told by your health care provider. Follow directions carefully. Blood pressure medicines must be taken as prescribed.  · Do not skip doses of blood pressure medicine. Doing this puts you at risk for problems and can make the medicine less effective.  · Ask your health care provider about side effects or reactions to medicines that you should watch for.  Contact a health care provider if:  · You think you are having a reaction to a medicine you are taking.  · You have headaches that keep coming back (recurring).  · You feel dizzy.  · You have swelling in your ankles.  · You have trouble with your vision.  Get help right away if:  · You develop a severe headache or confusion.  · You have unusual weakness or numbness.  · You feel faint.  · You have severe pain in your chest or abdomen.  · You vomit repeatedly.  · You have trouble breathing.  Summary  · Hypertension is when the force of blood pumping through your arteries is too strong. If this condition is not controlled, it may put you at risk for serious complications.  · Your personal target blood pressure may vary depending on your medical conditions, your age, and other factors. For most people, a normal blood pressure is less than 120/80.  · Hypertension is  treated with lifestyle changes, medicines, or a combination of both. Lifestyle changes include weight loss, eating a healthy, low-sodium diet, exercising more, and limiting alcohol.  This information is not intended to replace advice given to you by your health care provider. Make sure you discuss any questions you have with your health care provider.  Document Released: 12/18/2006 Document Revised: 11/15/2017 Document Reviewed: 11/15/2017  [employer] Interactive Patient Education © 2018 [employer]

## 2018-11-27 NOTE — PROGRESS NOTES
Subjective   Ellie Hardy is a 43 y.o. female     Chief Complaint   Patient presents with   • Follow-up     Pt in office for 8wk follow up appt   • Coronary Artery Disease   • Hypertension   • Chest Pain       HPI    Problem List:    1. Hypertension  2. CAD  2.1 Stress Test 3/28/17 - no ischemia; low risk   2.2 Stress Test 2/5/18 - no ischemia; preserved LVEF; inferobasal akinesis  2.3 left heart catheter 7/180/18-study excluded angiographically apparent epicardial coronary artery disease, she had no significant fixed stenosis in the LAD, EF 55%, left ventricular end-diastolic pressure was only 12  3. Shortness of breath  3.1 Echo 3/28/17 -  Mild to mod LVH; EF 55-60%; DD I; mild MR  3.2 Echo 2/5/18 - mild LVH; septal ridge at the base of septum without MAC or doppler evidence of LVOT obst; EF > 65%; DD I; trace MR  4. Palpitations   4.1 Event Monitor 3/13-3/26/17 - NSR - ST with PVCs/PACs  5. Fatigue  6. KEVYN - uses CPAP    Patient is a 43-year-old female who presents today for follow-up with her  at her side.  She denies any chest pain, pressure, palpitations, fluttering, presyncope, syncope, orthopnea, PND or edema.  She denies any shortness of breath with activity.  She has had some lightheaded and dizziness as she says at home her blood pressure is anywhere from 102 systolic to 110.  I advised her blood pressure states that low she can split her metoprolol in half and take half in the morning and half at night.  Otherwise she has felt very well.    Current Outpatient Medications   Medication Sig Dispense Refill   • aspirin 81 MG tablet Take 1 tablet by mouth Daily. 30 tablet 11   • brompheniramine-pseudoephedrine-DM (BROMFED DM) 30-2-10 MG/5ML syrup 10 mL Every 4 (Four) Hours.     • cholecalciferol (VITAMIN D3) 1000 units tablet Take 1,000 Units by mouth Daily.     • CloNIDine (CATAPRES) 0.1 MG tablet take 1 tablet by mouth three times a day if needed for HIGH 30 tablet 5   • DULoxetine (CYMBALTA) 30  MG capsule Take 30 mg by mouth Daily.     • furosemide (LASIX) 20 MG tablet Take lasix daily; taken 2 tabs of lasix for 2 days then return to normal dose (Patient taking differently: Take 20 mg by mouth Daily.) 90 tablet 3   • lisinopril-hydrochlorothiazide (PRINZIDE,ZESTORETIC) 10-12.5 MG per tablet Take 1 tablet by mouth Daily.     • metoprolol succinate XL (TOPROL-XL) 50 MG 24 hr tablet Take 1 tablet by mouth Daily. 30 tablet 11   • nitroglycerin (NITROSTAT) 0.4 MG SL tablet 1 under the tongue as needed for angina, may repeat q5mins for up three doses 30 tablet 5   • potassium chloride (K-DUR) 10 MEQ CR tablet Take 1 tablet when take 2 tablets of lasix only (Patient taking differently: Take 10 mEq by mouth Daily.) 15 tablet 5     No current facility-administered medications for this visit.        ALLERGIES    Other    Past Medical History:   Diagnosis Date   • Hypertension    • Sleep apnea     c-pap, followed by Dr. Loera       Social History     Socioeconomic History   • Marital status:      Spouse name: Not on file   • Number of children: Not on file   • Years of education: Not on file   • Highest education level: Not on file   Social Needs   • Financial resource strain: Not on file   • Food insecurity - worry: Not on file   • Food insecurity - inability: Not on file   • Transportation needs - medical: Not on file   • Transportation needs - non-medical: Not on file   Occupational History   • Not on file   Tobacco Use   • Smoking status: Never Smoker   • Smokeless tobacco: Never Used   Substance and Sexual Activity   • Alcohol use: No   • Drug use: No   • Sexual activity: Defer   Other Topics Concern   • Not on file   Social History Narrative   • Not on file       Family History   Problem Relation Age of Onset   • Charcot-Lexy-Tooth disease Mother    • Heart disease Father        Review of Systems   Constitutional: Positive for fatigue. Negative for diaphoresis.   HENT: Negative for congestion,  "rhinorrhea and sore throat.    Eyes: Negative for visual disturbance.   Respiratory: Positive for apnea (CPAP). Negative for chest tightness and shortness of breath.    Cardiovascular: Negative for chest pain, palpitations and leg swelling.   Gastrointestinal: Negative for abdominal pain, blood in stool, constipation, diarrhea, nausea and vomiting.   Endocrine: Negative for cold intolerance and heat intolerance.   Genitourinary: Negative for difficulty urinating, dysuria, frequency, hematuria and urgency.   Musculoskeletal: Positive for arthralgias (pt c/o lots of joint stiffness lately) and neck pain (Pt states that she's been having pain in the back of her neck and head). Negative for back pain.   Skin: Negative.  Negative for rash and wound.   Allergic/Immunologic: Positive for environmental allergies (seasonal) and food allergies (lobster).   Neurological: Positive for dizziness (w/ position changes), light-headedness (Pt states that she's had \"an episode or two\" that she thought she might pass out- since last OV ) and headaches (dull ache, was constant x 1 month. Now it's occasional). Negative for syncope, weakness and numbness.   Hematological: Bruises/bleeds easily.   Psychiatric/Behavioral: Positive for sleep disturbance (Wakes up a lot at HS to adjust CPAP).       Objective   /85   Pulse 77   Ht 162.6 cm (64\")   Wt 111 kg (243 lb 12.8 oz)   SpO2 99%   BMI 41.85 kg/m²   Vitals:    11/27/18 1556   BP: 138/85   Pulse: 77   SpO2: 99%   Weight: 111 kg (243 lb 12.8 oz)   Height: 162.6 cm (64\")      Lab Results (most recent)     None        Physical Exam   Constitutional: She is oriented to person, place, and time. Vital signs are normal. She appears well-developed and well-nourished. She is active and cooperative.   HENT:   Head: Normocephalic.   Eyes: Lids are normal.   Neck: Normal carotid pulses, no hepatojugular reflux and no JVD present. Carotid bruit is not present.   Cardiovascular: Normal rate, " regular rhythm and normal heart sounds.   Pulses:       Radial pulses are 2+ on the right side, and 2+ on the left side.        Dorsalis pedis pulses are 2+ on the right side, and 2+ on the left side.        Posterior tibial pulses are 2+ on the right side, and 2+ on the left side.   No edema BLE.   Pulmonary/Chest: Effort normal and breath sounds normal.   Abdominal: Normal appearance and bowel sounds are normal.   Neurological: She is alert and oriented to person, place, and time.   Skin: Skin is warm, dry and intact.   Psychiatric: She has a normal mood and affect. Her speech is normal and behavior is normal. Judgment and thought content normal. Cognition and memory are normal.       Procedure   Procedures         Assessment/Plan      Diagnosis Plan   1. Coronary artery disease involving native coronary artery of native heart without angina pectoris     2. Essential hypertension     3. KEVYN on CPAP     4. Shortness of breath         Return in about 6 months (around 5/27/2019).    CAD-patient is on aspirin, and beta.  Hypertension-patient doing much better.  KEVYN-patient is compliant with CPAP.  Shortness of breath-resolved.  She will continue her medication regimen.  She will follow-up in 6 months or sooner if any changes.         Patient's Body mass index is 41.85 kg/m². BMI is above normal parameters. Recommendations include: educational material.      Electronically signed by:

## 2019-01-08 DIAGNOSIS — R60.9 PERIPHERAL EDEMA: ICD-10-CM

## 2019-01-08 RX ORDER — FUROSEMIDE 20 MG/1
TABLET ORAL
Qty: 92 TABLET | Refills: 3 | Status: SHIPPED | OUTPATIENT
Start: 2019-01-08 | End: 2023-02-07 | Stop reason: DRUGHIGH

## 2019-05-29 ENCOUNTER — OFFICE VISIT (OUTPATIENT)
Dept: CARDIOLOGY | Facility: CLINIC | Age: 44
End: 2019-05-29

## 2019-05-29 VITALS
OXYGEN SATURATION: 97 % | SYSTOLIC BLOOD PRESSURE: 127 MMHG | WEIGHT: 250 LBS | BODY MASS INDEX: 42.68 KG/M2 | DIASTOLIC BLOOD PRESSURE: 85 MMHG | HEIGHT: 64 IN | HEART RATE: 85 BPM

## 2019-05-29 DIAGNOSIS — Z99.89 OSA ON CPAP: ICD-10-CM

## 2019-05-29 DIAGNOSIS — I25.10 CORONARY ARTERY DISEASE INVOLVING NATIVE CORONARY ARTERY OF NATIVE HEART WITHOUT ANGINA PECTORIS: Primary | ICD-10-CM

## 2019-05-29 DIAGNOSIS — R06.02 SHORTNESS OF BREATH: ICD-10-CM

## 2019-05-29 DIAGNOSIS — R07.2 PRECORDIAL PAIN: ICD-10-CM

## 2019-05-29 DIAGNOSIS — R00.2 PALPITATIONS: ICD-10-CM

## 2019-05-29 DIAGNOSIS — G47.33 OSA ON CPAP: ICD-10-CM

## 2019-05-29 DIAGNOSIS — I10 ESSENTIAL HYPERTENSION: ICD-10-CM

## 2019-05-29 PROCEDURE — 99213 OFFICE O/P EST LOW 20 MIN: CPT | Performed by: NURSE PRACTITIONER

## 2019-05-29 RX ORDER — METOPROLOL SUCCINATE 50 MG/1
50 TABLET, EXTENDED RELEASE ORAL DAILY
Qty: 90 TABLET | Refills: 3 | Status: SHIPPED | OUTPATIENT
Start: 2019-05-29 | End: 2021-09-29 | Stop reason: SDUPTHER

## 2019-05-29 RX ORDER — LISINOPRIL AND HYDROCHLOROTHIAZIDE 12.5; 1 MG/1; MG/1
1 TABLET ORAL DAILY
Qty: 90 TABLET | Refills: 3 | Status: SHIPPED | OUTPATIENT
Start: 2019-05-29 | End: 2021-09-29 | Stop reason: SDUPTHER

## 2019-05-29 NOTE — PATIENT INSTRUCTIONS
"Fat and Cholesterol Restricted Eating Plan  Getting too much fat and cholesterol in your diet may cause health problems. Choosing the right foods helps keep your fat and cholesterol at normal levels. This can keep you from getting certain diseases.  Your doctor may recommend an eating plan that includes:  · Total fat: ______% or less of total calories a day.  · Saturated fat: ______% or less of total calories a day.  · Cholesterol: less than _________mg a day.  · Fiber: ______g a day.    What are tips for following this plan?  General tips  · Work with your doctor to lose weight if you need to.  · Avoid:  ? Foods with added sugar.  ? Fried foods.  ? Foods with partially hydrogenated oils.  · Limit alcohol intake to no more than 1 drink a day for nonpregnant women and 2 drinks a day for men. One drink equals 12 oz of beer, 5 oz of wine, or 1½ oz of hard liquor.  Reading food labels  · Check food labels for:  ? Trans fats.  ? Partially hydrogenated oils.  ? Saturated fat (g) in each serving.  ? Cholesterol (mg) in each serving.  ? Fiber (g) in each serving.  · Choose foods with healthy fats, such as:  ? Monounsaturated fats.  ? Polyunsaturated fats.  ? Omega-3 fats.  · Choose grain products that have whole grains. Look for the word \"whole\" as the first word in the ingredient list.  Cooking  · Cook foods using low-fat methods. These include baking, boiling, grilling, and broiling.  · Eat more home-cooked foods. Eat at restaurants and buffets less often.  · Avoid cooking using saturated fats, such as butter, cream, palm oil, palm kernel oil, and coconut oil.  Meal planning  · At meals, divide your plate into four equal parts:  ? Fill one-half of your plate with vegetables and green salads.  ? Fill one-fourth of your plate with whole grains.  ? Fill one-fourth of your plate with low-fat (lean) protein foods.  · Eat fish that is high in omega-3 fats at least two times a week. This includes mackerel, tuna, sardines, and " salmon.  · Eat foods that are high in fiber, such as whole grains, beans, apples, broccoli, carrots, peas, and barley.  Recommended foods  Grains  · Whole grains, such as whole wheat or whole grain breads, crackers, cereals, and pasta. Unsweetened oatmeal, bulgur, barley, quinoa, or brown rice. Corn or whole wheat flour tortillas.  Vegetables  · Fresh or frozen vegetables (raw, steamed, roasted, or grilled). Green salads.  Fruits  · All fresh, canned (in natural juice), or frozen fruits.  Meats and other protein foods  · Ground beef (85% or leaner), grass-fed beef, or beef trimmed of fat. Skinless chicken or turkey. Ground chicken or turkey. Pork trimmed of fat. All fish and seafood. Egg whites. Dried beans, peas, or lentils. Unsalted nuts or seeds. Unsalted canned beans. Nut butters without added sugar or oil.  Dairy  · Low-fat or nonfat dairy products, such as skim or 1% milk, 2% or reduced-fat cheeses, low-fat and fat-free ricotta or cottage cheese, or plain low-fat and nonfat yogurt.  Fats and oils  · Tub margarine without trans fats. Light or reduced-fat mayonnaise and salad dressings. Avocado. Olive, canola, sesame, or safflower oils.  The items listed above may not be a complete list of recommended foods or beverages. Contact your dietitian for more options.  Foods to avoid  Grains  · White bread. White pasta. White rice. Cornbread. Bagels, pastries, and croissants. Crackers and snack foods that contain trans fat and hydrogenated oils.  Vegetables  · Vegetables cooked in cheese, cream, or butter sauce. Fried vegetables.  Fruits  · Canned fruit in heavy syrup. Fruit in cream or butter sauce. Fried fruit.  Meats and other protein foods  · Fatty cuts of meat. Ribs, chicken wings, guo, sausage, bologna, salami, chitterlings, fatback, hot dogs, bratwurst, and packaged lunch meats. Liver and organ meats. Whole eggs and egg yolks. Chicken and turkey with skin. Fried meat.  Dairy  · Whole or 2% milk, cream,  half-and-half, and cream cheese. Whole milk cheeses. Whole-fat or sweetened yogurt. Full-fat cheeses. Nondairy creamers and whipped toppings. Processed cheese, cheese spreads, and cheese curds.  Beverages  · Alcohol. Sugar-sweetened drinks such as sodas, lemonade, and fruit drinks.  Fats and oils  · Butter, stick margarine, lard, shortening, ghee, or guo fat. Coconut, palm kernel, and palm oils.  Sweets and desserts  · Corn syrup, sugars, honey, and molasses. Candy. Jam and jelly. Syrup. Sweetened cereals. Cookies, pies, cakes, donuts, muffins, and ice cream.  The items listed above may not be a complete list of foods and beverages to avoid. Contact your dietitian for more information.  Summary  · Choosing the right foods helps keep your fat and cholesterol at normal levels. This can keep you from getting certain diseases.  · At meals, fill one-half of your plate with vegetables and green salads.  · Eat high-fiber foods, like whole grains, beans, apples, carrots, peas, and barley.  · Limit added sugar, saturated fats, alcohol, and fried foods.  This information is not intended to replace advice given to you by your health care provider. Make sure you discuss any questions you have with your health care provider.  Document Released: 06/18/2013 Document Revised: 09/04/2018 Document Reviewed: 09/04/2018  [employer] Interactive Patient Education © 2019 [employer]  BMI for Adults  Body mass index (BMI) is a number that is calculated from a person's weight and height. In most adults, the number is used to find how much of an adult's weight is made up of fat. BMI is not as accurate as a direct measure of body fat.  How is BMI calculated?  BMI is calculated by dividing weight in kilograms by height in meters squared. It can also be calculated by dividing weight in pounds by height in inches squared, then multiplying the resulting number by 703. Charts are available to help you find your BMI quickly and easily without  doing this calculation.  How is BMI interpreted?  Health care professionals use BMI charts to identify whether an adult is underweight, at a normal weight, or overweight based on the following guidelines:  · Underweight: BMI less than 18.5.  · Normal weight: BMI between 18.5 and 24.9.  · Overweight: BMI between 25 and 29.9.  · Obese: BMI of 30 and above.    BMI is usually interpreted the same for males and females.  Weight includes both fat and muscle, so someone with a muscular build, such as an athlete, may have a BMI that is higher than 24.9. In cases like these, BMI may not accurately depict body fat. To determine if excess body fat is the cause of a BMI of 25 or higher, further assessments may need to be done by a health care provider.  Why is BMI a useful tool?  BMI is used to identify a possible weight problem that may be related to a medical problem or may increase the risk for medical problems. BMI can also be used to promote changes to reach a healthy weight.  This information is not intended to replace advice given to you by your health care provider. Make sure you discuss any questions you have with your health care provider.  Document Released: 08/29/2005 Document Revised: 04/27/2017 Document Reviewed: 05/15/2015  [employer] Interactive Patient Education © 2018 [employer]

## 2019-05-29 NOTE — PROGRESS NOTES
Subjective   Ellie Hardy is a 43 y.o. female     Chief Complaint   Patient presents with   • Follow-up     Here for a 6 month follow up        HPI    Problem List:    1. Hypertension  2. CAD  2.1 Stress Test 3/28/17 - no ischemia; low risk   2.2 Stress Test 2/5/18 - no ischemia; preserved LVEF; inferobasal akinesis  2.3 left heart catheter 7/180/18-study excluded angiographically apparent epicardial coronary artery disease, she had no significant fixed stenosis in the LAD, EF 55%, left ventricular end-diastolic pressure was only 12  3. Shortness of breath  3.1 Echo 3/28/17 -  Mild to mod LVH; EF 55-60%; DD I; mild MR  3.2 Echo 2/5/18 - mild LVH; septal ridge at the base of septum without MAC or doppler evidence of LVOT obst; EF > 65%; DD I; trace MR  4. Palpitations   4.1 Event Monitor 3/13-3/26/17 - NSR - ST with PVCs/PACs  5. Fatigue  6. EKVYN - uses CPAP    Patient is a 43-year-old female who presents today for follow-up.  She denies any chest pain, pressure, dizziness, presyncope, syncope, orthopnea, PND or edema.  She says she has very rare fluttering.  She says she gets a little lightheaded when she bends over but she has a lot of allergy issues.  She denies any shortness of breath with activity.  She says her blood pressure has been doing great and she is felt wonderful.  PCP monitors her cholesterol she [place] rechecked.    Current Outpatient Medications   Medication Sig Dispense Refill   • aspirin 81 MG tablet Take 1 tablet by mouth Daily. 90 tablet 3   • brompheniramine-pseudoephedrine-DM (BROMFED DM) 30-2-10 MG/5ML syrup 10 mL Every 4 (Four) Hours.     • cholecalciferol (VITAMIN D3) 1000 units tablet Take 1,000 Units by mouth Daily.     • CloNIDine (CATAPRES) 0.1 MG tablet take 1 tablet by mouth three times a day if needed for HIGH 30 tablet 5   • DULoxetine (CYMBALTA) 30 MG capsule Take 30 mg by mouth Daily.     • furosemide (LASIX) 20 MG tablet AT THE START OF THERAPY, TAKE  2 TABLETS DAILY FOR TWO DAYS AND THEN TAKE 1 TABLET DAILY THEREAFTER 92 tablet 3   • lisinopril-hydrochlorothiazide (PRINZIDE,ZESTORETIC) 10-12.5 MG per tablet Take 1 tablet by mouth Daily. 90 tablet 3   • metoprolol succinate XL (TOPROL-XL) 50 MG 24 hr tablet Take 1 tablet by mouth Daily. 90 tablet 3   • nitroglycerin (NITROSTAT) 0.4 MG SL tablet 1 under the tongue as needed for angina, may repeat q5mins for up three doses 30 tablet 5   • potassium chloride (K-DUR) 10 MEQ CR tablet Take 1 tablet when take 2 tablets of lasix only (Patient taking differently: Take 10 mEq by mouth Daily.) 15 tablet 5     No current facility-administered medications for this visit.        ALLERGIES    Other    Past Medical History:   Diagnosis Date   • Hypertension    • Sleep apnea     c-pap, followed by Dr. Loera       Social History     Socioeconomic History   • Marital status:      Spouse name: Not on file   • Number of children: Not on file   • Years of education: Not on file   • Highest education level: Not on file   Tobacco Use   • Smoking status: Never Smoker   • Smokeless tobacco: Never Used   Substance and Sexual Activity   • Alcohol use: No   • Drug use: No   • Sexual activity: Defer       Family History   Problem Relation Age of Onset   • Charcot-Lexy-Tooth disease Mother    • Stroke Mother    • Heart disease Father        Review of Systems   Constitutional: Negative for chills, fatigue and fever.   HENT: Negative for congestion, rhinorrhea and sore throat.    Eyes: Negative for visual disturbance.   Respiratory: Positive for apnea (Uses Cpap nightly ). Negative for chest tightness and shortness of breath.    Cardiovascular: Positive for palpitations (Rare flutters ). Negative for chest pain and leg swelling.   Gastrointestinal: Negative for abdominal pain, blood in stool, nausea and vomiting.   Endocrine: Positive for cold intolerance (stays cold most of the time ). Negative for heat intolerance.   Genitourinary:  "Negative for dysuria, frequency, hematuria and urgency.   Musculoskeletal: Positive for arthralgias (joints ). Negative for back pain.   Skin: Negative for rash and wound.   Allergic/Immunologic: Positive for environmental allergies (seasonal ) and food allergies (Lobster ).   Neurological: Positive for light-headedness (when bending over at times ). Negative for dizziness and weakness.   Hematological: Bruises/bleeds easily (Bruises easily ).   Psychiatric/Behavioral: Negative for sleep disturbance (denies waking with smothering or SOA).       Objective   /85 (BP Location: Left arm, Patient Position: Sitting)   Pulse 85   Ht 162.6 cm (64\")   Wt 113 kg (250 lb)   SpO2 97%   BMI 42.91 kg/m²   Vitals:    05/29/19 1544   BP: 127/85   BP Location: Left arm   Patient Position: Sitting   Pulse: 85   SpO2: 97%   Weight: 113 kg (250 lb)   Height: 162.6 cm (64\")      Lab Results (most recent)     None        Physical Exam   Constitutional: She is oriented to person, place, and time. Vital signs are normal. She appears well-developed and well-nourished. She is active and cooperative.   HENT:   Head: Normocephalic.   Eyes: Lids are normal.   Neck: Normal carotid pulses, no hepatojugular reflux and no JVD present. Carotid bruit is not present.   Cardiovascular: Normal rate, regular rhythm and normal heart sounds.   Pulses:       Radial pulses are 2+ on the right side, and 2+ on the left side.        Dorsalis pedis pulses are 2+ on the right side, and 2+ on the left side.        Posterior tibial pulses are 2+ on the right side, and 2+ on the left side.   No edema BLE.    Pulmonary/Chest: Effort normal and breath sounds normal.   Abdominal: Normal appearance and bowel sounds are normal.   Neurological: She is alert and oriented to person, place, and time.   Skin: Skin is warm, dry and intact.   Psychiatric: She has a normal mood and affect. Her speech is normal and behavior is normal. Judgment and thought content " normal. Cognition and memory are normal.       Procedure   Procedures         Assessment/Plan      Diagnosis Plan   1. Coronary artery disease involving native coronary artery of native heart without angina pectoris     2. Essential hypertension  lisinopril-hydrochlorothiazide (PRINZIDE,ZESTORETIC) 10-12.5 MG per tablet    metoprolol succinate XL (TOPROL-XL) 50 MG 24 hr tablet   3. Palpitations  metoprolol succinate XL (TOPROL-XL) 50 MG 24 hr tablet   4. KEVYN on CPAP     5. Shortness of breath     6. Precordial pain  aspirin 81 MG tablet       Return in about 6 months (around 11/29/2019).  CAD-patient is on aspirin and beta.  Hypertension-patient is doing very well on metoprolol and lisinopril/hydrochlorothiazide.  Palpitations-stable on beta-blocker.  KEVYN-patient is compliant with CPAP.  Shortness of breath-resolved.  Chest pain-resolved just refill patient's aspirin.  She will continue her medication regimen.  She will follow-up in 6 months or sooner if any changes.         Patient's Body mass index is 42.91 kg/m². BMI is above normal parameters. Recommendations include: educational material and referral to primary care.      Electronically signed by:

## 2019-12-03 ENCOUNTER — OFFICE VISIT (OUTPATIENT)
Dept: CARDIOLOGY | Facility: CLINIC | Age: 44
End: 2019-12-03

## 2019-12-03 VITALS
OXYGEN SATURATION: 100 % | SYSTOLIC BLOOD PRESSURE: 143 MMHG | WEIGHT: 247.6 LBS | DIASTOLIC BLOOD PRESSURE: 91 MMHG | BODY MASS INDEX: 42.27 KG/M2 | HEIGHT: 64 IN | HEART RATE: 85 BPM

## 2019-12-03 DIAGNOSIS — Z00.00 HEALTHCARE MAINTENANCE: ICD-10-CM

## 2019-12-03 DIAGNOSIS — Z99.89 OSA ON CPAP: ICD-10-CM

## 2019-12-03 DIAGNOSIS — I10 ESSENTIAL HYPERTENSION: ICD-10-CM

## 2019-12-03 DIAGNOSIS — G47.33 OSA ON CPAP: ICD-10-CM

## 2019-12-03 DIAGNOSIS — R00.2 PALPITATIONS: ICD-10-CM

## 2019-12-03 DIAGNOSIS — I25.10 CORONARY ARTERY DISEASE INVOLVING NATIVE CORONARY ARTERY OF NATIVE HEART WITHOUT ANGINA PECTORIS: Primary | ICD-10-CM

## 2019-12-03 DIAGNOSIS — R06.02 SHORTNESS OF BREATH: ICD-10-CM

## 2019-12-03 PROCEDURE — 93000 ELECTROCARDIOGRAM COMPLETE: CPT | Performed by: NURSE PRACTITIONER

## 2019-12-03 PROCEDURE — 99214 OFFICE O/P EST MOD 30 MIN: CPT | Performed by: NURSE PRACTITIONER

## 2019-12-03 NOTE — PROGRESS NOTES
Subjective   Ellie Hardy is a 44 y.o. female     Chief Complaint   Patient presents with   • Follow-up   • Coronary Artery Disease       HPI    Problem List:    1. Hypertension  2. CAD  2.1 Stress Test 3/28/17 - no ischemia; low risk   2.2 Stress Test 2/5/18 - no ischemia; preserved LVEF; inferobasal akinesis  2.3 left heart catheter 7/180/18-study excluded angiographically apparent epicardial coronary artery disease, she had no significant fixed stenosis in the LAD, EF 55%, left ventricular end-diastolic pressure was only 12  3. Shortness of breath  3.1 Echo 3/28/17 -  Mild to mod LVH; EF 55-60%; DD I; mild MR  3.2 Echo 2/5/18 - mild LVH; septal ridge at the base of septum without MAC or doppler evidence of LVOT obst; EF > 65%; DD I; trace MR  4. Palpitations   4.1 Event Monitor 3/13-3/26/17 - NSR - ST with PVCs/PACs  5. Fatigue  6. KEVYN - uses CPAP    Patient is a 44-year-old female who presents today for follow-up.  She denies any chest pain or pressure.  She says she has palpitations but seem like they have improved but whenever she exerts herself or bends over or is kneeling gets back up she says they are really bad and they make her feel like she wants to pass out.  She says she will get short of breath whenever this happens.  She denies any dizziness, syncope, orthopnea, PND or edema.  Again her only shortness of breath is whenever she has her palpitations.  She is going to have blood work with her PCP again in about 6 weeks due to significantly elevated liver enzymes.  I gave her blood work today so that she can also get it checked when she goes for that lab work and also to make sure it is not duplicated.    Current Outpatient Medications on File Prior to Visit   Medication Sig Dispense Refill   • aspirin 81 MG tablet Take 1 tablet by mouth Daily. 90 tablet 3   • cholecalciferol (VITAMIN D3) 1000 units tablet Take 1,000 Units by mouth Daily.     • CloNIDine (CATAPRES) 0.1 MG tablet take 1 tablet by mouth  three times a day if needed for HIGH 30 tablet 5   • DULoxetine (CYMBALTA) 30 MG capsule Take 30 mg by mouth Daily.     • furosemide (LASIX) 20 MG tablet AT THE START OF THERAPY, TAKE 2 TABLETS DAILY FOR TWO DAYS AND THEN TAKE 1 TABLET DAILY THEREAFTER 92 tablet 3   • lisinopril-hydrochlorothiazide (PRINZIDE,ZESTORETIC) 10-12.5 MG per tablet Take 1 tablet by mouth Daily. 90 tablet 3   • metoprolol succinate XL (TOPROL-XL) 50 MG 24 hr tablet Take 1 tablet by mouth Daily. 90 tablet 3   • nitroglycerin (NITROSTAT) 0.4 MG SL tablet 1 under the tongue as needed for angina, may repeat q5mins for up three doses 30 tablet 5   • potassium chloride (K-DUR) 10 MEQ CR tablet Take 1 tablet when take 2 tablets of lasix only (Patient taking differently: Take 10 mEq by mouth Daily.) 15 tablet 5   • [DISCONTINUED] brompheniramine-pseudoephedrine-DM (BROMFED DM) 30-2-10 MG/5ML syrup 10 mL Every 4 (Four) Hours.       No current facility-administered medications on file prior to visit.        ALLERGIES    Other    Past Medical History:   Diagnosis Date   • Hypertension    • Sleep apnea     c-pap, followed by Dr. Loera       Social History     Socioeconomic History   • Marital status:      Spouse name: Not on file   • Number of children: Not on file   • Years of education: Not on file   • Highest education level: Not on file   Tobacco Use   • Smoking status: Never Smoker   • Smokeless tobacco: Never Used   Substance and Sexual Activity   • Alcohol use: No   • Drug use: No   • Sexual activity: Defer       Family History   Problem Relation Age of Onset   • Charcot-Lexy-Tooth disease Mother    • Stroke Mother    • Heart disease Father        Review of Systems   Constitutional: Positive for diaphoresis (Mainly during the day. ) and fatigue.   HENT: Negative for congestion, rhinorrhea and sore throat.    Eyes: Positive for visual disturbance (dry eye- sometimes causes blurred vision ).   Respiratory: Positive for apnea (CPAP) and  "shortness of breath (when she has the palpitations ). Negative for chest tightness.    Cardiovascular: Positive for palpitations (Improved some, but still happens sometimes w/ exertion; when bending over, active or if she is kneeling and get back up it is the worst and feels like will pass out ). Negative for chest pain (Denies CP ) and leg swelling.   Gastrointestinal: Negative for abdominal pain, blood in stool, constipation, diarrhea, nausea and vomiting.   Endocrine: Positive for cold intolerance (States she has issues regulating her body temp, goes back and forth ) and heat intolerance.   Genitourinary: Negative for difficulty urinating, dysuria, frequency, hematuria and urgency.   Musculoskeletal: Positive for arthralgias (OA). Negative for back pain and neck pain.        Fibromyalgia    Skin: Negative for rash and wound.   Allergic/Immunologic: Positive for environmental allergies (seasonal ). Negative for food allergies.   Neurological: Positive for light-headedness (FEELS LIKE GOING TO PASS OUT WITH PALPITATIONS) and headaches (Thinks related to BS level). Negative for dizziness, syncope, weakness and numbness.   Hematological: Bruises/bleeds easily (bruises).   Psychiatric/Behavioral: Positive for sleep disturbance (Issues falling asleep and staying asleep ).       Objective   /91   Pulse 85   Ht 162.6 cm (64\")   Wt 112 kg (247 lb 9.6 oz)   SpO2 100%   BMI 42.50 kg/m²   Vitals:    12/03/19 1313   BP: 143/91   Pulse: 85   SpO2: 100%   Weight: 112 kg (247 lb 9.6 oz)   Height: 162.6 cm (64\")      Lab Results (most recent)     None        Physical Exam   Constitutional: She is oriented to person, place, and time. Vital signs are normal. She appears well-developed and well-nourished. She is active and cooperative.   HENT:   Head: Normocephalic.   Eyes: Lids are normal.   Neck: Normal carotid pulses, no hepatojugular reflux and no JVD present. Carotid bruit is not present.   Cardiovascular: Normal " rate, regular rhythm and normal heart sounds.   Pulses:       Radial pulses are 2+ on the right side, and 2+ on the left side.        Dorsalis pedis pulses are 2+ on the right side, and 2+ on the left side.        Posterior tibial pulses are 2+ on the right side, and 2+ on the left side.   No edema BLE.   Pulmonary/Chest: Effort normal and breath sounds normal.   Abdominal: Normal appearance and bowel sounds are normal.   Neurological: She is alert and oriented to person, place, and time.   Skin: Skin is warm, dry and intact.   Psychiatric: She has a normal mood and affect. Her speech is normal and behavior is normal. Judgment and thought content normal. Cognition and memory are normal.       Procedure     ECG 12 Lead  Date/Time: 12/3/2019 1:56 PM  Performed by: Karina Galindo APRN  Authorized by: Karina Galindo APRN   Comparison: compared with previous ECG from 6/26/2018  Similar to previous ECG  Rhythm: sinus rhythm  Rate: normal  BPM: 78  QRS axis: normal    Clinical impression: normal ECG                 Assessment/Plan      Diagnosis Plan   1. Coronary artery disease involving native coronary artery of native heart without angina pectoris     2. Essential hypertension  Basic Metabolic Panel   3. Palpitations  Basic Metabolic Panel    TSH    Magnesium    Cardiac Event Monitor   4. KEVYN on CPAP     5. Shortness of breath     6. Healthcare maintenance  Basic Metabolic Panel    TSH    Magnesium       Return in about 14 weeks (around 3/10/2020).    CAD-patient is on aspirin and beta, but no statin.  Hypertension-slightly elevated today on her metoprolol and lisinopril/hydrochlorothiazide but she says is actually been doing really well.  Palpitations-patient on beta but having significant palpitations with activity therefore she will wear an event monitor for 2 weeks.  She will get a TSH, magnesium and BMP.  KEVYN-patient is tolerant of CPAP.  Shortness of breath-only occurring with palpitations.  She will continue  her medication regimen for now.  She will follow-up in 14 weeks or sooner if any changes.         Patient's Body mass index is 42.5 kg/m². BMI is above normal parameters. Recommendations include: educational material.      Electronically signed by:

## 2019-12-03 NOTE — PATIENT INSTRUCTIONS
"Fat and Cholesterol Restricted Eating Plan  Getting too much fat and cholesterol in your diet may cause health problems. Choosing the right foods helps keep your fat and cholesterol at normal levels. This can keep you from getting certain diseases.  Your doctor may recommend an eating plan that includes:  · Total fat: ______% or less of total calories a day.  · Saturated fat: ______% or less of total calories a day.  · Cholesterol: less than _________mg a day.  · Fiber: ______g a day.  What are tips for following this plan?  Meal planning  · At meals, divide your plate into four equal parts:  ? Fill one-half of your plate with vegetables and green salads.  ? Fill one-fourth of your plate with whole grains.  ? Fill one-fourth of your plate with low-fat (lean) protein foods.  · Eat fish that is high in omega-3 fats at least two times a week. This includes mackerel, tuna, sardines, and salmon.  · Eat foods that are high in fiber, such as whole grains, beans, apples, broccoli, carrots, peas, and barley.  General tips    · Work with your doctor to lose weight if you need to.  · Avoid:  ? Foods with added sugar.  ? Fried foods.  ? Foods with partially hydrogenated oils.  · Limit alcohol intake to no more than 1 drink a day for nonpregnant women and 2 drinks a day for men. One drink equals 12 oz of beer, 5 oz of wine, or 1½ oz of hard liquor.  Reading food labels  · Check food labels for:  ? Trans fats.  ? Partially hydrogenated oils.  ? Saturated fat (g) in each serving.  ? Cholesterol (mg) in each serving.  ? Fiber (g) in each serving.  · Choose foods with healthy fats, such as:  ? Monounsaturated fats.  ? Polyunsaturated fats.  ? Omega-3 fats.  · Choose grain products that have whole grains. Look for the word \"whole\" as the first word in the ingredient list.  Cooking  · Cook foods using low-fat methods. These include baking, boiling, grilling, and broiling.  · Eat more home-cooked foods. Eat at restaurants and buffets " less often.  · Avoid cooking using saturated fats, such as butter, cream, palm oil, palm kernel oil, and coconut oil.  Recommended foods    Fruits  · All fresh, canned (in natural juice), or frozen fruits.  Vegetables  · Fresh or frozen vegetables (raw, steamed, roasted, or grilled). Green salads.  Grains  · Whole grains, such as whole wheat or whole grain breads, crackers, cereals, and pasta. Unsweetened oatmeal, bulgur, barley, quinoa, or brown rice. Corn or whole wheat flour tortillas.  Meats and other protein foods  · Ground beef (85% or leaner), grass-fed beef, or beef trimmed of fat. Skinless chicken or turkey. Ground chicken or turkey. Pork trimmed of fat. All fish and seafood. Egg whites. Dried beans, peas, or lentils. Unsalted nuts or seeds. Unsalted canned beans. Nut butters without added sugar or oil.  Dairy  · Low-fat or nonfat dairy products, such as skim or 1% milk, 2% or reduced-fat cheeses, low-fat and fat-free ricotta or cottage cheese, or plain low-fat and nonfat yogurt.  Fats and oils  · Tub margarine without trans fats. Light or reduced-fat mayonnaise and salad dressings. Avocado. Olive, canola, sesame, or safflower oils.  The items listed above may not be a complete list of foods and beverages you can eat. Contact a dietitian for more information.  Foods to avoid  Fruits  · Canned fruit in heavy syrup. Fruit in cream or butter sauce. Fried fruit.  Vegetables  · Vegetables cooked in cheese, cream, or butter sauce. Fried vegetables.  Grains  · White bread. White pasta. White rice. Cornbread. Bagels, pastries, and croissants. Crackers and snack foods that contain trans fat and hydrogenated oils.  Meats and other protein foods  · Fatty cuts of meat. Ribs, chicken wings, guo, sausage, bologna, salami, chitterlings, fatback, hot dogs, bratwurst, and packaged lunch meats. Liver and organ meats. Whole eggs and egg yolks. Chicken and turkey with skin. Fried meat.  Dairy  · Whole or 2% milk, cream,  half-and-half, and cream cheese. Whole milk cheeses. Whole-fat or sweetened yogurt. Full-fat cheeses. Nondairy creamers and whipped toppings. Processed cheese, cheese spreads, and cheese curds.  Beverages  · Alcohol. Sugar-sweetened drinks such as sodas, lemonade, and fruit drinks.  Fats and oils  · Butter, stick margarine, lard, shortening, ghee, or guo fat. Coconut, palm kernel, and palm oils.  Sweets and desserts  · Corn syrup, sugars, honey, and molasses. Candy. Jam and jelly. Syrup. Sweetened cereals. Cookies, pies, cakes, donuts, muffins, and ice cream.  The items listed above may not be a complete list of foods and beverages you should avoid. Contact a dietitian for more information.  Summary  · Choosing the right foods helps keep your fat and cholesterol at normal levels. This can keep you from getting certain diseases.  · At meals, fill one-half of your plate with vegetables and green salads.  · Eat high-fiber foods, like whole grains, beans, apples, carrots, peas, and barley.  · Limit added sugar, saturated fats, alcohol, and fried foods.  This information is not intended to replace advice given to you by your health care provider. Make sure you discuss any questions you have with your health care provider.  Document Released: 06/18/2013 Document Revised: 08/21/2019 Document Reviewed: 09/04/2018  [employer] Interactive Patient Education © 2019 [employer]  BMI for Adults    Body mass index (BMI) is a number that is calculated from a person's weight and height. BMI may help to estimate how much of a person's weight is composed of fat. BMI can help identify those who may be at higher risk for certain medical problems.  How is BMI used with adults?  BMI is used as a screening tool to identify possible weight problems. It is used to check whether a person is obese, overweight, healthy weight, or underweight.  How is BMI calculated?  BMI measures your weight and compares it to your height. This can be done  "either in English ([place] or metric measurements. Note that charts are available to help you find your BMI quickly and easily without having to do these calculations yourself.  To calculate your BMI in English ([place] measurements, your health care provider will:  1. Measure your weight in pounds (lb).  2. Multiply the number of pounds by 703.  ? For example, for a person who weighs 180 lb, multiply that number by 703, which equals 126,540.  3. Measure your height in inches (in). Then multiply that number by itself to get a measurement called \"inches squared.\"  ? For example, for a person who is 70 in tall, the \"inches squared\" measurement is 70 in x 70 in, which equals 4900 inches squared.  4. Divide the total from Step 2 (number of lb x 703) by the total from Step 3 (inches squared): 126,540 ÷ 4900 = 25.8. This is your BMI.  To calculate your BMI in metric measurements, your health care provider will:  1. Measure your weight in kilograms (kg).  2. Measure your height in meters (m). Then multiply that number by itself to get a measurement called \"meters squared.\"  ? For example, for a person who is 1.75 m tall, the \"meters squared\" measurement is 1.75 m x 1.75 m, which is equal to 3.1 meters squared.  3. Divide the number of kilograms (your weight) by the meters squared number. In this example: 70 ÷ 3.1 = 22.6. This is your BMI.  How is BMI interpreted?  To interpret your results, your health care provider will use BMI charts to identify whether you are underweight, normal weight, overweight, or obese. The following guidelines will be used:  · Underweight: BMI less than 18.5.  · Normal weight: BMI between 18.5 and 24.9.  · Overweight: BMI between 25 and 29.9.  · Obese: BMI of 30 and above.  Please note:  · Weight includes both fat and muscle, so someone with a muscular build, such as an athlete, may have a BMI that is higher than 24.9. In cases like these, BMI is not an accurate measure of body fat.  · To determine " if excess body fat is the cause of a BMI of 25 or higher, further assessments may need to be done by a health care provider.  · BMI is usually interpreted in the same way for men and women.  Why is BMI a useful tool?  BMI is useful in two ways:  · Identifying a weight problem that may be related to a medical condition, or that may increase the risk for medical problems.  · Promoting lifestyle and diet changes in order to reach a healthy weight.  Summary  · Body mass index (BMI) is a number that is calculated from a person's weight and height.  · BMI may help to estimate how much of a person's weight is composed of fat. BMI can help identify those who may be at higher risk for certain medical problems.  · BMI can be measured using English measurements or metric measurements.  · To interpret your results, your health care provider will use BMI charts to identify whether you are underweight, normal weight, overweight, or obese.  This information is not intended to replace advice given to you by your health care provider. Make sure you discuss any questions you have with your health care provider.  Document Released: 08/29/2005 Document Revised: 10/31/2018 Document Reviewed: 10/31/2018  [employer] Interactive Patient Education © 2019 [employer]    Palpitations  Palpitations are feelings that your heartbeat is irregular or is faster than normal. It may feel like your heart is fluttering or skipping a beat. Palpitations are usually not a serious problem. They may be caused by many things, including smoking, caffeine, alcohol, stress, and certain medicines or drugs. Most causes of palpitations are not serious. However, some palpitations can be a sign of a serious problem. You may need further tests to rule out serious medical problems.  Follow these instructions at home:         Pay attention to any changes in your condition. Take these actions to help manage your symptoms:  Eating and drinking  · Avoid foods and drinks  that may cause palpitations. These may include:  ? Caffeinated coffee, tea, soft drinks, diet pills, and energy drinks.  ? Chocolate.  ? Alcohol.  Lifestyle  · Take steps to reduce your stress and anxiety. Things that can help you relax include:  ? Yoga.  ? Mind-body activities, such as deep breathing, meditation, or using words and images to create positive thoughts (guided imagery).  ? Physical activity, such as swimming, jogging, or walking. Tell your health care provider if your palpitations increase with activity. If you have chest pain or shortness of breath with activity, do not continue the activity until you are seen by your health care provider.  ? Biofeedback. This is a method that helps you learn to use your mind to control things in your body, such as your heartbeat.  · Do not use drugs, including cocaine or ecstasy. Do not use marijuana.  · Get plenty of rest and sleep. Keep a regular bed time.  General instructions  · Take over-the-counter and prescription medicines only as told by your health care provider.  · Do not use any products that contain nicotine or tobacco, such as cigarettes and e-cigarettes. If you need help quitting, ask your health care provider.  · Keep all follow-up visits as told by your health care provider. This is important. These may include visits for further testing if palpitations do not go away or get worse.  Contact a health care provider if you:  · Continue to have a fast or irregular heartbeat after 24 hours.  · Notice that your palpitations occur more often.  Get help right away if you:  · Have chest pain or shortness of breath.  · Have a severe headache.  · Feel dizzy or you faint.  Summary  · Palpitations are feelings that your heartbeat is irregular or is faster than normal. It may feel like your heart is fluttering or skipping a beat.  · Palpitations may be caused by many things, including smoking, caffeine, alcohol, stress, certain medicines, and drugs.  · Although  most causes of palpitations are not serious, some causes can be a sign of a serious medical problem.  · Get help right away if you faint or have chest pain, shortness of breath, a severe headache, or dizziness.  This information is not intended to replace advice given to you by your health care provider. Make sure you discuss any questions you have with your health care provider.  Document Released: 12/15/2001 Document Revised: 01/30/2019 Document Reviewed: 01/30/2019  [employer] Interactive Patient Education © 2019 [employer]

## 2020-01-07 ENCOUNTER — TELEPHONE (OUTPATIENT)
Dept: CARDIOLOGY | Facility: CLINIC | Age: 45
End: 2020-01-07

## 2020-01-07 NOTE — TELEPHONE ENCOUNTER
Received pt's monitor results, baseline HR 84 BPM. 0 critical, 0 serious, and 2 stable events.     Per Karina, keep follow up appt on 4/9/2020.           First attempt to reach pt. Left a voicemail informing pt of normal results and to call with any questions.

## 2020-04-09 ENCOUNTER — OFFICE VISIT (OUTPATIENT)
Dept: CARDIOLOGY | Facility: CLINIC | Age: 45
End: 2020-04-09

## 2020-04-09 VITALS — BODY MASS INDEX: 41.83 KG/M2 | HEIGHT: 64 IN | WEIGHT: 245 LBS

## 2020-04-09 DIAGNOSIS — R00.2 PALPITATIONS: ICD-10-CM

## 2020-04-09 DIAGNOSIS — G47.33 OSA ON CPAP: ICD-10-CM

## 2020-04-09 DIAGNOSIS — I25.10 CORONARY ARTERY DISEASE INVOLVING NATIVE CORONARY ARTERY OF NATIVE HEART WITHOUT ANGINA PECTORIS: Primary | ICD-10-CM

## 2020-04-09 DIAGNOSIS — I10 ESSENTIAL HYPERTENSION: ICD-10-CM

## 2020-04-09 DIAGNOSIS — Z99.89 OSA ON CPAP: ICD-10-CM

## 2020-04-09 DIAGNOSIS — R06.02 SHORTNESS OF BREATH: ICD-10-CM

## 2020-04-09 PROCEDURE — 99441 PR PHYS/QHP TELEPHONE EVALUATION 5-10 MIN: CPT | Performed by: NURSE PRACTITIONER

## 2020-04-09 NOTE — PATIENT INSTRUCTIONS
Palpitations  Palpitations are feelings that your heartbeat is irregular or is faster than normal. It may feel like your heart is fluttering or skipping a beat. Palpitations are usually not a serious problem. They may be caused by many things, including smoking, caffeine, alcohol, stress, and certain medicines or drugs. Most causes of palpitations are not serious. However, some palpitations can be a sign of a serious problem. You may need further tests to rule out serious medical problems.  Follow these instructions at home:         Pay attention to any changes in your condition. Take these actions to help manage your symptoms:  Eating and drinking  · Avoid foods and drinks that may cause palpitations. These may include:  ? Caffeinated coffee, tea, soft drinks, diet pills, and energy drinks.  ? Chocolate.  ? Alcohol.  Lifestyle  · Take steps to reduce your stress and anxiety. Things that can help you relax include:  ? Yoga.  ? Mind-body activities, such as deep breathing, meditation, or using words and images to create positive thoughts (guided imagery).  ? Physical activity, such as swimming, jogging, or walking. Tell your health care provider if your palpitations increase with activity. If you have chest pain or shortness of breath with activity, do not continue the activity until you are seen by your health care provider.  ? Biofeedback. This is a method that helps you learn to use your mind to control things in your body, such as your heartbeat.  · Do not use drugs, including cocaine or ecstasy. Do not use marijuana.  · Get plenty of rest and sleep. Keep a regular bed time.  General instructions  · Take over-the-counter and prescription medicines only as told by your health care provider.  · Do not use any products that contain nicotine or tobacco, such as cigarettes and e-cigarettes. If you need help quitting, ask your health care provider.  · Keep all follow-up visits as told by your health care provider. This  is important. These may include visits for further testing if palpitations do not go away or get worse.  Contact a health care provider if you:  · Continue to have a fast or irregular heartbeat after 24 hours.  · Notice that your palpitations occur more often.  Get help right away if you:  · Have chest pain or shortness of breath.  · Have a severe headache.  · Feel dizzy or you faint.  Summary  · Palpitations are feelings that your heartbeat is irregular or is faster than normal. It may feel like your heart is fluttering or skipping a beat.  · Palpitations may be caused by many things, including smoking, caffeine, alcohol, stress, certain medicines, and drugs.  · Although most causes of palpitations are not serious, some causes can be a sign of a serious medical problem.  · Get help right away if you faint or have chest pain, shortness of breath, a severe headache, or dizziness.  This information is not intended to replace advice given to you by your health care provider. Make sure you discuss any questions you have with your health care provider.  Document Released: 12/15/2001 Document Revised: 01/30/2019 Document Reviewed: 01/30/2019  [employer] Interactive Patient Education © 2020 [employer]

## 2020-04-09 NOTE — PROGRESS NOTES
Subjective   Ellie Hardy is a 44 y.o. female     Chief Complaint   Patient presents with   • Coronary Artery Disease       HPI    Problem List:    1. Hypertension  2. CAD  2.1 Stress Test 3/28/17 - no ischemia; low risk   2.2 Stress Test 2/5/18 - no ischemia; preserved LVEF; inferobasal akinesis  2.3 left heart catheter 7/18/18-study excluded angiographically apparent epicardial coronary artery disease, she had no significant fixed stenosis in the LAD, EF 55%, left ventricular end-diastolic pressure was only 12  3. Shortness of breath  3.1 Echo 3/28/17 -  Mild to mod LVH; EF 55-60%; DD I; mild MR  3.2 Echo 2/5/18 - mild LVH; septal ridge at the base of septum without MAC or doppler evidence of LVOT obst; EF > 65%; DD I; trace MR  4. Palpitations   4.1 Event Monitor 3/13-3/26/17 - NSR - ST with PVCs/PACs  4.2 event monitor 12/9-12/22/19-normal sinus rhythm, no ectopy  5. Fatigue  6. KEVYN - uses CPAP    Patient is a 44-year-old female who presents today via telephone visit and replacement of her regular office visit as a result of COVID19.  She denies any chest pain or pressure.  She says she does still have palpitations but they are only occasional they are much better than previously and they only occur with activity.  She denies any dizziness, presyncope, syncope, orthopnea, PND or edema.  She does get short of breath with activity and this has not changed.  She does use her CPAP as she is prescribed.    We went over event monitor.  I did offer increasing her metoprolol if she feels like her palpitations are still problematic.  At this time she feels like they are not that bad so she would like to wait.    Current Outpatient Medications on File Prior to Visit   Medication Sig Dispense Refill   • aspirin 81 MG tablet Take 1 tablet by mouth Daily. 90 tablet 3   • cholecalciferol (VITAMIN D3) 1000 units tablet Take 1,000 Units by mouth Daily.     • CloNIDine (CATAPRES) 0.1 MG tablet take 1 tablet by mouth three  times a day if needed for HIGH 30 tablet 5   • DULoxetine (CYMBALTA) 30 MG capsule Take 30 mg by mouth Daily.     • furosemide (LASIX) 20 MG tablet AT THE START OF THERAPY, TAKE 2 TABLETS DAILY FOR TWO DAYS AND THEN TAKE 1 TABLET DAILY THEREAFTER (Patient taking differently: Take 20 mg by mouth As Needed.) 92 tablet 3   • lisinopril-hydrochlorothiazide (PRINZIDE,ZESTORETIC) 10-12.5 MG per tablet Take 1 tablet by mouth Daily. 90 tablet 3   • metoprolol succinate XL (TOPROL-XL) 50 MG 24 hr tablet Take 1 tablet by mouth Daily. 90 tablet 3   • nitroglycerin (NITROSTAT) 0.4 MG SL tablet 1 under the tongue as needed for angina, may repeat q5mins for up three doses 30 tablet 5   • potassium chloride (K-DUR) 10 MEQ CR tablet Take 1 tablet when take 2 tablets of lasix only (Patient taking differently: Take 10 mEq by mouth As Needed (with Lasix).) 15 tablet 5     No current facility-administered medications on file prior to visit.        ALLERGIES    Other    Past Medical History:   Diagnosis Date   • Hypertension    • Sleep apnea     c-pap, followed by Dr. Loera       Social History     Socioeconomic History   • Marital status:      Spouse name: Not on file   • Number of children: Not on file   • Years of education: Not on file   • Highest education level: Not on file   Tobacco Use   • Smoking status: Never Smoker   • Smokeless tobacco: Never Used   Substance and Sexual Activity   • Alcohol use: No   • Drug use: No   • Sexual activity: Defer       Family History   Problem Relation Age of Onset   • Charcot-Lexy-Tooth disease Mother    • Stroke Mother    • Heart disease Father        Review of Systems   Constitutional: Positive for fatigue (easily fatigued). Negative for diaphoresis.   HENT: Negative for congestion, rhinorrhea and sneezing.    Eyes: Negative for visual disturbance.   Respiratory: Positive for apnea (KEVYN with CPAP use) and shortness of breath (easily SOA on exertion; no change ). Negative for cough,  "chest tightness and wheezing.    Cardiovascular: Positive for palpitations (occasional palpitations/flutters; much better occurs with activity ). Negative for chest pain and leg swelling.   Gastrointestinal: Negative for abdominal pain, nausea and vomiting.   Endocrine: Negative for cold intolerance and heat intolerance.   Genitourinary: Positive for frequency (urinary frequency with elevated blood glucose levels). Negative for difficulty urinating and urgency.   Musculoskeletal: Positive for arthralgias (joints). Negative for back pain and neck pain.   Skin: Negative for rash and wound.   Allergic/Immunologic: Positive for food allergies (lobster). Negative for environmental allergies.   Neurological: Negative for dizziness, syncope, weakness and light-headedness.   Hematological: Bruises/bleeds easily (bruises and bleeds easily).   Psychiatric/Behavioral: Negative for agitation, confusion and sleep disturbance (denies waking up smothering/SOA). The patient is not nervous/anxious.        Objective   Ht 162.6 cm (64\")   Wt 111 kg (245 lb)   BMI 42.05 kg/m²   Vitals:    04/09/20 1327   Weight: 111 kg (245 lb)   Height: 162.6 cm (64\")      Lab Results (most recent)     None        Physical Exam   Psychiatric: She has a normal mood and affect. Her speech is normal and behavior is normal. Judgment and thought content normal. Cognition and memory are normal.       Procedure   Procedures         Assessment/Plan      Diagnosis Plan   1. Coronary artery disease involving native coronary artery of native heart without angina pectoris     2. Essential hypertension     3. Palpitations     4. Shortness of breath     5. KEVYN on CPAP         Return in about 6 months (around 10/9/2020).    CAD-patient is on aspirin and beta.  Hypertension-patient is on lisinopril/hydrochlorothiazide and metoprolol.  Palpitations-stable on beta-blocker.  Shortness of breath-stable.  KEVYN-patient is compliant with CPAP.  Should continue her " medication regimen.  She will follow-up in 6 months or sooner if anything changes.     You have chosen to receive care through a telephone visit today. Do you consent to use a telephone visit for your medical care today? YES  This visit has been rescheduled as a phone visit to comply with patient safety concerns in accordance with CDC recommendations. Total time of discussion was 5 minutes.        Patient's Body mass index is 42.05 kg/m². BMI is above normal parameters. Recommendations include: educational material and referral to primary care.      Electronically signed by:

## 2020-09-03 DIAGNOSIS — R07.2 PRECORDIAL PAIN: ICD-10-CM

## 2020-09-04 RX ORDER — ASPIRIN 81 MG/1
TABLET, COATED ORAL
Qty: 90 TABLET | Refills: 3 | Status: SHIPPED | OUTPATIENT
Start: 2020-09-04 | End: 2021-11-17 | Stop reason: SDUPTHER

## 2020-10-15 ENCOUNTER — OFFICE VISIT (OUTPATIENT)
Dept: CARDIOLOGY | Facility: CLINIC | Age: 45
End: 2020-10-15

## 2020-10-15 VITALS
BODY MASS INDEX: 41.66 KG/M2 | OXYGEN SATURATION: 98 % | SYSTOLIC BLOOD PRESSURE: 119 MMHG | HEIGHT: 64 IN | WEIGHT: 244 LBS | DIASTOLIC BLOOD PRESSURE: 86 MMHG | HEART RATE: 94 BPM

## 2020-10-15 DIAGNOSIS — R06.02 SHORTNESS OF BREATH: ICD-10-CM

## 2020-10-15 DIAGNOSIS — Z99.89 OSA ON CPAP: ICD-10-CM

## 2020-10-15 DIAGNOSIS — R00.2 PALPITATIONS: ICD-10-CM

## 2020-10-15 DIAGNOSIS — I25.10 CORONARY ARTERY DISEASE INVOLVING NATIVE CORONARY ARTERY OF NATIVE HEART WITHOUT ANGINA PECTORIS: Primary | ICD-10-CM

## 2020-10-15 DIAGNOSIS — I51.89 GRADE I DIASTOLIC DYSFUNCTION: ICD-10-CM

## 2020-10-15 DIAGNOSIS — G47.33 OSA ON CPAP: ICD-10-CM

## 2020-10-15 DIAGNOSIS — I10 ESSENTIAL HYPERTENSION: ICD-10-CM

## 2020-10-15 DIAGNOSIS — I49.3 PVC (PREMATURE VENTRICULAR CONTRACTION): ICD-10-CM

## 2020-10-15 DIAGNOSIS — I49.1 PREMATURE ATRIAL COMPLEXES: ICD-10-CM

## 2020-10-15 PROBLEM — M35.00 SJOGREN'S DISEASE: Status: ACTIVE | Noted: 2020-10-15

## 2020-10-15 PROCEDURE — 99214 OFFICE O/P EST MOD 30 MIN: CPT | Performed by: NURSE PRACTITIONER

## 2020-10-15 RX ORDER — HYDROXYCHLOROQUINE SULFATE 400 MG/1
400 TABLET ORAL DAILY
COMMUNITY

## 2020-10-15 NOTE — PATIENT INSTRUCTIONS
"Fat and Cholesterol Restricted Eating Plan  Getting too much fat and cholesterol in your diet may cause health problems. Choosing the right foods helps keep your fat and cholesterol at normal levels. This can keep you from getting certain diseases.  Your doctor may recommend an eating plan that includes:  · Total fat: ______% or less of total calories a day.  · Saturated fat: ______% or less of total calories a day.  · Cholesterol: less than _________mg a day.  · Fiber: ______g a day.  What are tips for following this plan?  Meal planning  · At meals, divide your plate into four equal parts:  ? Fill one-half of your plate with vegetables and green salads.  ? Fill one-fourth of your plate with whole grains.  ? Fill one-fourth of your plate with low-fat (lean) protein foods.  · Eat fish that is high in omega-3 fats at least two times a week. This includes mackerel, tuna, sardines, and salmon.  · Eat foods that are high in fiber, such as whole grains, beans, apples, broccoli, carrots, peas, and barley.  General tips    · Work with your doctor to lose weight if you need to.  · Avoid:  ? Foods with added sugar.  ? Fried foods.  ? Foods with partially hydrogenated oils.  · Limit alcohol intake to no more than 1 drink a day for nonpregnant women and 2 drinks a day for men. One drink equals 12 oz of beer, 5 oz of wine, or 1½ oz of hard liquor.  Reading food labels  · Check food labels for:  ? Trans fats.  ? Partially hydrogenated oils.  ? Saturated fat (g) in each serving.  ? Cholesterol (mg) in each serving.  ? Fiber (g) in each serving.  · Choose foods with healthy fats, such as:  ? Monounsaturated fats.  ? Polyunsaturated fats.  ? Omega-3 fats.  · Choose grain products that have whole grains. Look for the word \"whole\" as the first word in the ingredient list.  Cooking  · Cook foods using low-fat methods. These include baking, boiling, grilling, and broiling.  · Eat more home-cooked foods. Eat at restaurants and buffets " less often.  · Avoid cooking using saturated fats, such as butter, cream, palm oil, palm kernel oil, and coconut oil.  Recommended foods    Fruits  · All fresh, canned (in natural juice), or frozen fruits.  Vegetables  · Fresh or frozen vegetables (raw, steamed, roasted, or grilled). Green salads.  Grains  · Whole grains, such as whole wheat or whole grain breads, crackers, cereals, and pasta. Unsweetened oatmeal, bulgur, barley, quinoa, or brown rice. Corn or whole wheat flour tortillas.  Meats and other protein foods  · Ground beef (85% or leaner), grass-fed beef, or beef trimmed of fat. Skinless chicken or turkey. Ground chicken or turkey. Pork trimmed of fat. All fish and seafood. Egg whites. Dried beans, peas, or lentils. Unsalted nuts or seeds. Unsalted canned beans. Nut butters without added sugar or oil.  Dairy  · Low-fat or nonfat dairy products, such as skim or 1% milk, 2% or reduced-fat cheeses, low-fat and fat-free ricotta or cottage cheese, or plain low-fat and nonfat yogurt.  Fats and oils  · Tub margarine without trans fats. Light or reduced-fat mayonnaise and salad dressings. Avocado. Olive, canola, sesame, or safflower oils.  The items listed above may not be a complete list of foods and beverages you can eat. Contact a dietitian for more information.  Foods to avoid  Fruits  · Canned fruit in heavy syrup. Fruit in cream or butter sauce. Fried fruit.  Vegetables  · Vegetables cooked in cheese, cream, or butter sauce. Fried vegetables.  Grains  · White bread. White pasta. White rice. Cornbread. Bagels, pastries, and croissants. Crackers and snack foods that contain trans fat and hydrogenated oils.  Meats and other protein foods  · Fatty cuts of meat. Ribs, chicken wings, guo, sausage, bologna, salami, chitterlings, fatback, hot dogs, bratwurst, and packaged lunch meats. Liver and organ meats. Whole eggs and egg yolks. Chicken and turkey with skin. Fried meat.  Dairy  · Whole or 2% milk, cream,  half-and-half, and cream cheese. Whole milk cheeses. Whole-fat or sweetened yogurt. Full-fat cheeses. Nondairy creamers and whipped toppings. Processed cheese, cheese spreads, and cheese curds.  Beverages  · Alcohol. Sugar-sweetened drinks such as sodas, lemonade, and fruit drinks.  Fats and oils  · Butter, stick margarine, lard, shortening, ghee, or guo fat. Coconut, palm kernel, and palm oils.  Sweets and desserts  · Corn syrup, sugars, honey, and molasses. Candy. Jam and jelly. Syrup. Sweetened cereals. Cookies, pies, cakes, donuts, muffins, and ice cream.  The items listed above may not be a complete list of foods and beverages you should avoid. Contact a dietitian for more information.  Summary  · Choosing the right foods helps keep your fat and cholesterol at normal levels. This can keep you from getting certain diseases.  · At meals, fill one-half of your plate with vegetables and green salads.  · Eat high-fiber foods, like whole grains, beans, apples, carrots, peas, and barley.  · Limit added sugar, saturated fats, alcohol, and fried foods.  This information is not intended to replace advice given to you by your health care provider. Make sure you discuss any questions you have with your health care provider.  Document Released: 06/18/2013 Document Revised: 08/21/2019 Document Reviewed: 09/04/2018  [employer] Patient Education © 2020 [employer]  BMI for Adults  What is BMI?  Body mass index (BMI) is a number that is calculated from a person's weight and height. BMI can help estimate how much of a person's weight is composed of fat. BMI does not measure body fat directly. Rather, it is an alternative to procedures that directly measure body fat, which can be difficult and expensive.  BMI can help identify people who may be at higher risk for certain medical problems.  What are BMI measurements used for?  BMI is used as a screening tool to identify possible weight problems. It helps determine whether a  "person is obese, overweight, a healthy weight, or underweight.  BMI is useful for:  · Identifying a weight problem that may be related to a medical condition or may increase the risk for medical problems.  · Promoting changes, such as changes in diet and exercise, to help reach a healthy weight. BMI screening can be repeated to see if these changes are working.  How is BMI calculated?  BMI involves measuring your weight in relation to your height. Both height and weight are measured, and the BMI is calculated from those numbers. This can be done either in English ([place] or metric measurements. Note that charts and online BMI calculators are available to help you find your BMI quickly and easily without having to do these calculations yourself.  To calculate your BMI in English ([place] measurements:    1. Measure your weight in pounds (lb).  2. Multiply the number of pounds by 703.  ? For example, for a person who weighs 180 lb, multiply that number by 703, which equals 126,540.  3. Measure your height in inches. Then multiply that number by itself to get a measurement called \"inches squared.\"  ? For example, for a person who is 70 inches tall, the \"inches squared\" measurement is 70 inches x 70 inches, which equals 4,900 inches squared.  4. Divide the total from step 2 (number of lb x 703) by the total from step 3 (inches squared): 126,540 ÷ 4,900 = 25.8. This is your BMI.  To calculate your BMI in metric measurements:  1. Measure your weight in kilograms (kg).  2. Measure your height in meters (m). Then multiply that number by itself to get a measurement called \"meters squared.\"  ? For example, for a person who is 1.75 m tall, the \"meters squared\" measurement is 1.75 m x 1.75 m, which is equal to 3.1 meters squared.  3. Divide the number of kilograms (your weight) by the meters squared number. In this example: 70 ÷ 3.1 = 22.6. This is your BMI.  What do the results mean?  BMI charts are used to identify whether you " are underweight, normal weight, overweight, or obese. The following guidelines will be used:  · Underweight: BMI less than 18.5.  · Normal weight: BMI between 18.5 and 24.9.  · Overweight: BMI between 25 and 29.9.  · Obese: BMI of 30 or above.  Keep these notes in mind:  · Weight includes both fat and muscle, so someone with a muscular build, such as an athlete, may have a BMI that is higher than 24.9. In cases like these, BMI is not an accurate measure of body fat.  · To determine if excess body fat is the cause of a BMI of 25 or higher, further assessments may need to be done by a health care provider.  · BMI is usually interpreted in the same way for men and women.  Where to find more information  For more information about BMI, including tools to quickly calculate your BMI, go to these websites:  · Centers for Disease Control and Prevention: www.cdc.gov  · American Heart Association: www.heart.org  · National Heart, Lung, and [facility] [place]: www.nhlbi.nih.gov  Summary  · Body mass index (BMI) is a number that is calculated from a person's weight and height.  · BMI may help estimate how much of a person's weight is composed of fat. BMI can help identify those who may be at higher risk for certain medical problems.  · BMI can be measured using English measurements or metric measurements.  · BMI charts are used to identify whether you are underweight, normal weight, overweight, or obese.  This information is not intended to replace advice given to you by your health care provider. Make sure you discuss any questions you have with your health care provider.  Document Released: 08/29/2005 Document Revised: 09/09/2020 Document Reviewed: 07/17/2020  [employer] Patient Education © 2020 [employer]

## 2020-10-15 NOTE — PROGRESS NOTES
Subjective   Ellie Hardy is a 45 y.o. female     Chief Complaint   Patient presents with   • Follow-up     six month follow up       HPI    Problem List:    1. Hypertension  2. CAD  2.1 Stress Test 3/28/17 - no ischemia; low risk   2.2 Stress Test 2/5/18 - no ischemia; preserved LVEF; inferobasal akinesis  2.3 left heart catheter 7/18/18-study excluded angiographically apparent epicardial coronary artery disease, she had no significant fixed stenosis in the LAD, EF 55%, left ventricular end-diastolic pressure was only 12  3. Shortness of breath  3.1 Echo 3/28/17 -  Mild to mod LVH; EF 55-60%; DD I; mild MR  3.2 Echo 2/5/18 - mild LVH; septal ridge at the base of septum without MAC or doppler evidence of LVOT obst; EF > 65%; DD I; trace MR  4. Palpitations   4.1 Event Monitor 3/13-3/26/17 - NSR - ST with PVCs/PACs  4.2 event monitor 12/9-12/22/19-normal sinus rhythm, no ectopy  5. Fatigue  6. KEVYN - uses CPAP  7. Sjogrens, sees Dr. Reyes  8. Fatty Liver    Patient is a 45-year-old female who presents today for follow-up.  She denies any chest pain, pressure, palpitations, fluttering, dizziness, presyncope, syncope, orthopnea, PND or edema.  She denies any shortness of breath with activity.  She is still using her CPAP.  She says overall she has been doing very well.  Patient has recently been diagnosed with Sjogrens.  She is seeing Dr. Reyes.  She says they also think that she may either have rheumatoid arthritis and/or lupus.  PCP monitors her cholesterol.      Current Outpatient Medications on File Prior to Visit   Medication Sig Dispense Refill   • ASPIRIN LOW DOSE 81 MG EC tablet TAKE 1 TABLET DAILY 90 tablet 3   • cholecalciferol (VITAMIN D3) 1000 units tablet Take 1,000 Units by mouth Daily.     • CloNIDine (CATAPRES) 0.1 MG tablet take 1 tablet by mouth three times a day if needed for HIGH 30 tablet 5   • furosemide (LASIX) 20 MG tablet AT THE START OF THERAPY, TAKE 2 TABLETS DAILY FOR TWO DAYS AND THEN  TAKE 1 TABLET DAILY THEREAFTER (Patient taking differently: Take 20 mg by mouth As Needed.) 92 tablet 3   • hydroxychloroquine (PLAQUENIL) 200 MG tablet Take 400 mg by mouth Daily.     • lisinopril-hydrochlorothiazide (PRINZIDE,ZESTORETIC) 10-12.5 MG per tablet Take 1 tablet by mouth Daily. 90 tablet 3   • metoprolol succinate XL (TOPROL-XL) 50 MG 24 hr tablet Take 1 tablet by mouth Daily. 90 tablet 3   • nitroglycerin (NITROSTAT) 0.4 MG SL tablet 1 under the tongue as needed for angina, may repeat q5mins for up three doses 30 tablet 5   • potassium chloride (K-DUR) 10 MEQ CR tablet Take 1 tablet when take 2 tablets of lasix only (Patient taking differently: Take 10 mEq by mouth As Needed (with Lasix).) 15 tablet 5   • DULoxetine (CYMBALTA) 30 MG capsule Take 30 mg by mouth Daily.       No current facility-administered medications on file prior to visit.        ALLERGIES    Other    Past Medical History:   Diagnosis Date   • Hypertension    • Sjogren-Ernestina syndrome    • Sleep apnea     c-pap, followed by Dr. Loera       Social History     Socioeconomic History   • Marital status:      Spouse name: Not on file   • Number of children: Not on file   • Years of education: Not on file   • Highest education level: Not on file   Tobacco Use   • Smoking status: Never Smoker   • Smokeless tobacco: Never Used   Substance and Sexual Activity   • Alcohol use: No   • Drug use: No   • Sexual activity: Defer       Family History   Problem Relation Age of Onset   • Charcot-Lexy-Tooth disease Mother    • Stroke Mother    • Heart disease Father        Review of Systems   Constitutional: Positive for fatigue (fatigues easily). Negative for chills, diaphoresis and fever.   HENT: Negative for congestion, rhinorrhea and sore throat.    Eyes: Negative for visual disturbance.   Respiratory: Positive for apnea (cpap at night). Negative for cough, chest tightness, shortness of breath and wheezing.    Cardiovascular: Negative for  "chest pain, palpitations and leg swelling.   Gastrointestinal: Negative for abdominal pain, blood in stool, nausea and vomiting.   Endocrine: Negative for cold intolerance and heat intolerance.   Genitourinary: Negative for dysuria, frequency, hematuria and urgency.   Musculoskeletal: Positive for arthralgias (joints) and back pain (low back). Negative for neck pain.   Skin: Negative for rash and wound.   Allergic/Immunologic: Positive for environmental allergies (seasonal) and food allergies (lobster).   Neurological: Negative for dizziness, weakness and light-headedness.   Hematological: Does not bruise/bleed easily (bruising).   Psychiatric/Behavioral: Negative for sleep disturbance (denies waking with smothering at night).       Objective   /86 (BP Location: Left arm, Patient Position: Sitting)   Pulse 94   Ht 162.6 cm (64\")   Wt 111 kg (244 lb)   SpO2 98%   BMI 41.88 kg/m²   Vitals:    10/15/20 1308   BP: 119/86   BP Location: Left arm   Patient Position: Sitting   Pulse: 94   SpO2: 98%   Weight: 111 kg (244 lb)   Height: 162.6 cm (64\")      Lab Results (most recent)     None        Physical Exam  Vitals signs reviewed.   Constitutional:       General: She is awake.      Appearance: Normal appearance. She is well-developed. She is obese.   HENT:      Head: Normocephalic.   Eyes:      General: Lids are normal.   Neck:      Vascular: No carotid bruit, hepatojugular reflux or JVD.   Cardiovascular:      Rate and Rhythm: Normal rate and regular rhythm.      Pulses:           Radial pulses are 2+ on the right side and 2+ on the left side.        Dorsalis pedis pulses are 2+ on the right side and 2+ on the left side.        Posterior tibial pulses are 2+ on the right side and 2+ on the left side.      Heart sounds: Normal heart sounds.   Pulmonary:      Effort: Pulmonary effort is normal.      Breath sounds: Normal breath sounds and air entry.   Abdominal:      General: Bowel sounds are normal.      " Palpations: Abdomen is soft.   Musculoskeletal:      Right lower leg: No edema.      Left lower leg: No edema.   Skin:     General: Skin is warm and dry.   Neurological:      Mental Status: She is alert and oriented to person, place, and time.   Psychiatric:         Attention and Perception: Attention and perception normal.         Mood and Affect: Mood and affect normal.         Speech: Speech normal.         Behavior: Behavior normal. Behavior is cooperative.         Thought Content: Thought content normal.         Cognition and Memory: Cognition and memory normal.         Judgment: Judgment normal.         Procedure   Procedures         Assessment/Plan      Diagnosis Plan   1. Coronary artery disease involving native coronary artery of native heart without angina pectoris     2. Essential hypertension     3. Palpitations     4. Shortness of breath     5. KEVYN on CPAP     6. PVC (premature ventricular contraction)     7. Premature atrial complexes     8. Grade I diastolic dysfunction         Return in about 6 months (around 4/15/2021).    CAD-patient is on aspirin and statin.  Hypertension-patient's doing very well on lisinopril/hydrochlorothiazide and metoprolol.  Palpitations/PACs and PVCs-stable on beta-blocker.  Shortness of breath-resolved.  KEVYN-patient is compliant with CPAP.  Diastolic dysfunction-patient is on hydrochlorothiazide and has Lasix use as needed.  She will continue her medication regimen.  She will follow up in 6 months or sooner if any changes.       Ellie Antony  reports that she has never smoked. She has never used smokeless tobacco.       Patient's Body mass index is 41.88 kg/m². BMI is above normal parameters. Recommendations include: educational material and referral to primary care.      Electronically signed by:

## 2021-02-01 ENCOUNTER — APPOINTMENT (OUTPATIENT)
Dept: WOMENS IMAGING | Facility: HOSPITAL | Age: 46
End: 2021-02-01

## 2021-02-01 PROCEDURE — 77067 SCR MAMMO BI INCL CAD: CPT | Performed by: RADIOLOGY

## 2021-02-01 PROCEDURE — 77063 BREAST TOMOSYNTHESIS BI: CPT | Performed by: RADIOLOGY

## 2021-09-29 ENCOUNTER — TELEPHONE (OUTPATIENT)
Dept: CARDIOLOGY | Facility: CLINIC | Age: 46
End: 2021-09-29

## 2021-09-29 ENCOUNTER — LAB (OUTPATIENT)
Dept: CARDIOLOGY | Facility: CLINIC | Age: 46
End: 2021-09-29

## 2021-09-29 ENCOUNTER — OFFICE VISIT (OUTPATIENT)
Dept: CARDIOLOGY | Facility: CLINIC | Age: 46
End: 2021-09-29

## 2021-09-29 VITALS
BODY MASS INDEX: 42 KG/M2 | HEIGHT: 64 IN | SYSTOLIC BLOOD PRESSURE: 133 MMHG | OXYGEN SATURATION: 99 % | WEIGHT: 246 LBS | TEMPERATURE: 98.3 F | DIASTOLIC BLOOD PRESSURE: 90 MMHG | HEART RATE: 76 BPM

## 2021-09-29 DIAGNOSIS — R42 DIZZINESS: ICD-10-CM

## 2021-09-29 DIAGNOSIS — R07.89 CHEST TIGHTNESS: ICD-10-CM

## 2021-09-29 DIAGNOSIS — R00.2 PALPITATIONS: ICD-10-CM

## 2021-09-29 DIAGNOSIS — I10 ESSENTIAL HYPERTENSION: ICD-10-CM

## 2021-09-29 DIAGNOSIS — G47.33 OSA ON CPAP: ICD-10-CM

## 2021-09-29 DIAGNOSIS — R06.02 SHORTNESS OF BREATH: ICD-10-CM

## 2021-09-29 DIAGNOSIS — R00.2 PALPITATIONS: Primary | ICD-10-CM

## 2021-09-29 DIAGNOSIS — I25.10 CORONARY ARTERY DISEASE INVOLVING NATIVE CORONARY ARTERY OF NATIVE HEART WITHOUT ANGINA PECTORIS: ICD-10-CM

## 2021-09-29 DIAGNOSIS — I51.89 GRADE I DIASTOLIC DYSFUNCTION: ICD-10-CM

## 2021-09-29 DIAGNOSIS — I49.1 PREMATURE ATRIAL COMPLEXES: ICD-10-CM

## 2021-09-29 DIAGNOSIS — I49.3 PVC (PREMATURE VENTRICULAR CONTRACTION): ICD-10-CM

## 2021-09-29 DIAGNOSIS — Z99.89 OSA ON CPAP: ICD-10-CM

## 2021-09-29 LAB
ALBUMIN SERPL-MCNC: 4.54 G/DL (ref 3.5–5.2)
ALBUMIN/GLOB SERPL: 1.6 G/DL
ALP SERPL-CCNC: 50 U/L (ref 39–117)
ALT SERPL W P-5'-P-CCNC: 36 U/L (ref 1–33)
ANION GAP SERPL CALCULATED.3IONS-SCNC: 10.4 MMOL/L (ref 5–15)
AST SERPL-CCNC: 31 U/L (ref 1–32)
BASOPHILS # BLD AUTO: 0.05 10*3/MM3 (ref 0–0.2)
BASOPHILS NFR BLD AUTO: 0.9 % (ref 0–1.5)
BILIRUB SERPL-MCNC: 0.6 MG/DL (ref 0–1.2)
BUN SERPL-MCNC: 8 MG/DL (ref 6–20)
BUN/CREAT SERPL: 9.1 (ref 7–25)
CALCIUM SPEC-SCNC: 10 MG/DL (ref 8.6–10.5)
CHLORIDE SERPL-SCNC: 103 MMOL/L (ref 98–107)
CHOLEST SERPL-MCNC: 172 MG/DL (ref 0–200)
CO2 SERPL-SCNC: 27.6 MMOL/L (ref 22–29)
CREAT SERPL-MCNC: 0.88 MG/DL (ref 0.57–1)
DEPRECATED RDW RBC AUTO: 39.4 FL (ref 37–54)
EOSINOPHIL # BLD AUTO: 0.09 10*3/MM3 (ref 0–0.4)
EOSINOPHIL NFR BLD AUTO: 1.6 % (ref 0.3–6.2)
ERYTHROCYTE [DISTWIDTH] IN BLOOD BY AUTOMATED COUNT: 11.9 % (ref 12.3–15.4)
GFR SERPL CREATININE-BSD FRML MDRD: 69 ML/MIN/1.73
GLOBULIN UR ELPH-MCNC: 2.8 GM/DL
GLUCOSE SERPL-MCNC: 104 MG/DL (ref 65–99)
HCT VFR BLD AUTO: 39.3 % (ref 34–46.6)
HDLC SERPL-MCNC: 54 MG/DL (ref 40–60)
HGB BLD-MCNC: 13.3 G/DL (ref 12–15.9)
IMM GRANULOCYTES # BLD AUTO: 0.02 10*3/MM3 (ref 0–0.05)
IMM GRANULOCYTES NFR BLD AUTO: 0.4 % (ref 0–0.5)
LDLC SERPL CALC-MCNC: 97 MG/DL (ref 0–100)
LDLC/HDLC SERPL: 1.74 {RATIO}
LYMPHOCYTES # BLD AUTO: 1.91 10*3/MM3 (ref 0.7–3.1)
LYMPHOCYTES NFR BLD AUTO: 33.6 % (ref 19.6–45.3)
MAGNESIUM SERPL-MCNC: 2 MG/DL (ref 1.6–2.6)
MCH RBC QN AUTO: 30.5 PG (ref 26.6–33)
MCHC RBC AUTO-ENTMCNC: 33.8 G/DL (ref 31.5–35.7)
MCV RBC AUTO: 90.1 FL (ref 79–97)
MONOCYTES # BLD AUTO: 0.37 10*3/MM3 (ref 0.1–0.9)
MONOCYTES NFR BLD AUTO: 6.5 % (ref 5–12)
NEUTROPHILS NFR BLD AUTO: 3.25 10*3/MM3 (ref 1.7–7)
NEUTROPHILS NFR BLD AUTO: 57 % (ref 42.7–76)
NRBC BLD AUTO-RTO: 0 /100 WBC (ref 0–0.2)
PLATELET # BLD AUTO: 229 10*3/MM3 (ref 140–450)
PMV BLD AUTO: 10 FL (ref 6–12)
POTASSIUM SERPL-SCNC: 4.8 MMOL/L (ref 3.5–5.2)
PROT SERPL-MCNC: 7.3 G/DL (ref 6–8.5)
RBC # BLD AUTO: 4.36 10*6/MM3 (ref 3.77–5.28)
SODIUM SERPL-SCNC: 141 MMOL/L (ref 136–145)
T4 FREE SERPL-MCNC: 1.16 NG/DL (ref 0.93–1.7)
TRIGL SERPL-MCNC: 119 MG/DL (ref 0–150)
TSH SERPL DL<=0.05 MIU/L-ACNC: 2.28 UIU/ML (ref 0.27–4.2)
VLDLC SERPL-MCNC: 21 MG/DL (ref 5–40)
WBC # BLD AUTO: 5.69 10*3/MM3 (ref 3.4–10.8)

## 2021-09-29 PROCEDURE — 36415 COLL VENOUS BLD VENIPUNCTURE: CPT

## 2021-09-29 PROCEDURE — 99214 OFFICE O/P EST MOD 30 MIN: CPT | Performed by: NURSE PRACTITIONER

## 2021-09-29 PROCEDURE — 84443 ASSAY THYROID STIM HORMONE: CPT | Performed by: NURSE PRACTITIONER

## 2021-09-29 PROCEDURE — 80053 COMPREHEN METABOLIC PANEL: CPT | Performed by: NURSE PRACTITIONER

## 2021-09-29 PROCEDURE — 84439 ASSAY OF FREE THYROXINE: CPT | Performed by: NURSE PRACTITIONER

## 2021-09-29 PROCEDURE — 83735 ASSAY OF MAGNESIUM: CPT | Performed by: NURSE PRACTITIONER

## 2021-09-29 PROCEDURE — 80061 LIPID PANEL: CPT | Performed by: NURSE PRACTITIONER

## 2021-09-29 PROCEDURE — 85025 COMPLETE CBC W/AUTO DIFF WBC: CPT | Performed by: NURSE PRACTITIONER

## 2021-09-29 PROCEDURE — 84481 FREE ASSAY (FT-3): CPT | Performed by: NURSE PRACTITIONER

## 2021-09-29 PROCEDURE — 93000 ELECTROCARDIOGRAM COMPLETE: CPT | Performed by: NURSE PRACTITIONER

## 2021-09-29 RX ORDER — LISINOPRIL AND HYDROCHLOROTHIAZIDE 12.5; 1 MG/1; MG/1
1 TABLET ORAL DAILY
Qty: 90 TABLET | Refills: 3 | Status: SHIPPED | OUTPATIENT
Start: 2021-09-29 | End: 2022-11-09

## 2021-09-29 RX ORDER — METOPROLOL SUCCINATE 50 MG/1
50 TABLET, EXTENDED RELEASE ORAL DAILY
Qty: 30 TABLET | Refills: 0 | Status: SHIPPED | OUTPATIENT
Start: 2021-09-29 | End: 2021-12-16

## 2021-09-29 RX ORDER — METOPROLOL SUCCINATE 50 MG/1
50 TABLET, EXTENDED RELEASE ORAL DAILY
Qty: 90 TABLET | Refills: 3 | Status: SHIPPED | OUTPATIENT
Start: 2021-09-29 | End: 2021-09-29 | Stop reason: SDUPTHER

## 2021-09-29 RX ORDER — METOPROLOL SUCCINATE 50 MG/1
50 TABLET, EXTENDED RELEASE ORAL DAILY
Qty: 30 TABLET | Refills: 1 | Status: SHIPPED | OUTPATIENT
Start: 2021-09-29 | End: 2021-09-29 | Stop reason: SDUPTHER

## 2021-09-29 NOTE — TELEPHONE ENCOUNTER
Pt was advised of lab results :    TSH   0.270 - 4.200 uIU/mL 2.280      Creatinine   0.57 - 1.00 mg/dL 0.88      Sodium   136 - 145 mmol/L 141      Potassium   3.5 - 5.2 mmol/L 4.8      Total Cholesterol   0 - 200 mg/dL 172    Triglycerides   0 - 150 mg/dL 119    HDL Cholesterol   40 - 60 mg/dL 54    LDL Cholesterol    0 - 100 mg/dL 97      Magnesium   1.6 - 2.6 mg/dL 2.0      Hemoglobin   12.0 - 15.9 g/dL 13.3    Hematocrit   34.0 - 46.6 % 39.3      PAKO Linton

## 2021-09-29 NOTE — TELEPHONE ENCOUNTER
----- Message from JAXON Nelson sent at 9/29/2021  4:21 PM EDT -----  Please advise patient.  Forwarded to PCP.

## 2021-09-29 NOTE — PROGRESS NOTES
Subjective   Ellie Hardy is a 46 y.o. female     Chief Complaint   Patient presents with   • Follow-up   • Coronary Artery Disease       HPI    Problem List:    1. Hypertension  2. CAD  2.1 Stress Test 3/28/17 - no ischemia; low risk   2.2 Stress Test 2/5/18 - no ischemia; preserved LVEF; inferobasal akinesis  2.3 left heart catheter 7/18/18-study excluded angiographically apparent epicardial coronary artery disease, she had no significant fixed stenosis in the LAD, EF 55%, left ventricular end-diastolic pressure was only 12  3. Shortness of breath  3.1 Echo 3/28/17 -  Mild to mod LVH; EF 55-60%; DD I; mild MR  3.2 Echo 2/5/18 - mild LVH; septal ridge at the base of septum without MAC or doppler evidence of LVOT obst; EF > 65%; DD I; trace MR  4. Palpitations   4.1 Event Monitor 3/13-3/26/17 - NSR - ST with PVCs/PACs  4.2 event monitor 12/9-12/22/19-normal sinus rhythm, no ectopy  5. Fatigue  6. KEVYN - uses CPAP  7. Sjogrens, sees Dr. Reyes  8. Fatty Liver    Patient is a 46-year-old female who presents today for follow-up.  She denies any chest pain or pressure.  She says she has noticed for the last 2 months she is having increase in palpitations where it feels like her heart is skipping.  She did go without her metoprolol but that is only been for the last 2 days.  She says that she does have some dizziness and lightheadedness when she bends over and raises back up.  She denies any presyncope, syncope or PND.  She does get swelling in her legs and she says for about the past month again it seems like it has been worse.  But she has not had to use her water pill.  Patient says she does feel like she wakes up at times and she is not breathing however she uses a CPAP.  Patient says that she does have shortness of breath again this has increased in the last 1 to 2 months.  She says she cannot hold out and is much as she could before.  Patient has not had any recent blood work.    Current Outpatient Medications on  File Prior to Visit   Medication Sig Dispense Refill   • ASPIRIN LOW DOSE 81 MG EC tablet TAKE 1 TABLET DAILY 90 tablet 3   • cholecalciferol (VITAMIN D3) 1000 units tablet Take 1,000 Units by mouth Daily.     • CloNIDine (CATAPRES) 0.1 MG tablet take 1 tablet by mouth three times a day if needed for HIGH 30 tablet 5   • furosemide (LASIX) 20 MG tablet AT THE START OF THERAPY, TAKE 2 TABLETS DAILY FOR TWO DAYS AND THEN TAKE 1 TABLET DAILY THEREAFTER (Patient taking differently: Take 20 mg by mouth As Needed.) 92 tablet 3   • hydroxychloroquine (PLAQUENIL) 200 MG tablet Take 400 mg by mouth Daily.     • nitroglycerin (NITROSTAT) 0.4 MG SL tablet 1 under the tongue as needed for angina, may repeat q5mins for up three doses 30 tablet 5   • potassium chloride (K-DUR) 10 MEQ CR tablet Take 1 tablet when take 2 tablets of lasix only (Patient taking differently: Take 10 mEq by mouth As Needed (with Lasix).) 15 tablet 5   • [DISCONTINUED] lisinopril-hydrochlorothiazide (PRINZIDE,ZESTORETIC) 10-12.5 MG per tablet Take 1 tablet by mouth Daily. 90 tablet 3   • [DISCONTINUED] metoprolol succinate XL (TOPROL-XL) 50 MG 24 hr tablet Take 1 tablet by mouth Daily. 90 tablet 3     No current facility-administered medications on file prior to visit.       ALLERGIES    Other    Past Medical History:   Diagnosis Date   • COVID-19 vaccine administered 03/08/2021    2nd -04/05/2021 - Moderna    • Hypertension    • Sjogren-Ernestina syndrome    • Sleep apnea     c-pap, followed by Dr. Loera       Social History     Socioeconomic History   • Marital status:      Spouse name: Not on file   • Number of children: Not on file   • Years of education: Not on file   • Highest education level: Not on file   Tobacco Use   • Smoking status: Never Smoker   • Smokeless tobacco: Never Used   Substance and Sexual Activity   • Alcohol use: No   • Drug use: No   • Sexual activity: Defer       Family History   Problem Relation Age of Onset   •  Charcot-Lexy-Tooth disease Mother    • Stroke Mother    • Heart disease Father        Review of Systems   Constitutional: Negative for appetite change, chills, diaphoresis, fatigue and fever.   HENT: Negative for congestion, rhinorrhea and sore throat.    Eyes: Negative for visual disturbance.   Respiratory: Positive for shortness of breath (increased sob , walking at any distance; worse for about 1-2 mos; cant hold out as much  ). Negative for cough, chest tightness and wheezing.    Cardiovascular: Positive for palpitations (increased Palps, she feels like her heart skips 1-2 mos couple times a week, 2 days ago ) and leg swelling (legs, feet and ankles worse in the past month the swelling goes down at night; havent had to use water pill ). Negative for chest pain.   Gastrointestinal: Negative for abdominal pain, blood in stool, constipation, diarrhea, nausea and vomiting.   Endocrine: Negative for cold intolerance and heat intolerance.   Genitourinary: Negative for difficulty urinating, dysuria, frequency, hematuria and urgency.   Musculoskeletal: Positive for arthralgias (through out her body , she  has had elevated RA Factors ), back pain (muscle spasms in back and rad to chest; moving position helps, mostly  notices it at work ) and joint swelling (hands , ankles ). Negative for neck pain and neck stiffness.   Skin: Negative for color change, pallor, rash and wound.   Allergic/Immunologic: Positive for food allergies (lobster ). Negative for environmental allergies.   Neurological: Positive for dizziness (when she is bending down and raises back up ), weakness (legs ), light-headedness (bending over and raises up ) and numbness (hands and wrist . ( numbness in legs have increased in the past 2 months ) ). Negative for headaches.   Hematological: Bruises/bleeds easily (Brusies and bleeds easy at times ).   Psychiatric/Behavioral: Positive for sleep disturbance ([facility] , but for the past few nights she has  "been waking up and she feels like she has stopped breathing ).       Objective   /90 (BP Location: Left arm)   Pulse 76   Temp 98.3 °F (36.8 °C)   Ht 162.6 cm (64\")   Wt 112 kg (246 lb)   SpO2 99%   BMI 42.23 kg/m²   Vitals:    09/29/21 1004   BP: 133/90   BP Location: Left arm   Pulse: 76   Temp: 98.3 °F (36.8 °C)   SpO2: 99%   Weight: 112 kg (246 lb)   Height: 162.6 cm (64\")      Lab Results (most recent)     None        Physical Exam  Vitals reviewed.   Constitutional:       General: She is awake.      Appearance: Normal appearance. She is well-developed and well-groomed. She is morbidly obese.   HENT:      Head: Normocephalic.   Eyes:      General: Lids are normal.   Neck:      Vascular: No carotid bruit, hepatojugular reflux or JVD.   Cardiovascular:      Rate and Rhythm: Normal rate and regular rhythm.      Pulses:           Radial pulses are 2+ on the right side and 2+ on the left side.        Dorsalis pedis pulses are 2+ on the right side and 2+ on the left side.        Posterior tibial pulses are 2+ on the right side and 2+ on the left side.      Heart sounds: Normal heart sounds.   Pulmonary:      Effort: Pulmonary effort is normal.      Breath sounds: Normal breath sounds and air entry.   Abdominal:      General: Bowel sounds are normal.      Palpations: Abdomen is soft.   Musculoskeletal:      Right lower leg: No edema.      Left lower leg: No edema.   Skin:     General: Skin is warm and dry.   Neurological:      Mental Status: She is alert and oriented to person, place, and time.   Psychiatric:         Attention and Perception: Attention and perception normal.         Mood and Affect: Mood and affect normal.         Speech: Speech normal.         Behavior: Behavior normal. Behavior is cooperative.         Thought Content: Thought content normal.         Cognition and Memory: Cognition and memory normal.         Judgment: Judgment normal.         Procedure     ECG 12 Lead    Date/Time: " 9/29/2021 10:35 AM  Performed by: Karina Galindo APRN  Authorized by: Karina Galindo APRN   Comparison: compared with previous ECG from 12/3/2019  Similar to previous ECG  Rhythm: sinus rhythm  Rate: normal  BPM: 71  QRS axis: normal  Other findings: low voltage    Clinical impression: non-specific ECG                 Assessment/Plan      Diagnosis Plan   1. Palpitations  ECG 12 Lead    metoprolol succinate XL (TOPROL-XL) 50 MG 24 hr tablet    TSH    T3, Free    T4, Free    Magnesium    Cardiac Event Monitor    Adult Transthoracic Echo Complete W/ Cont if Necessary Per Protocol   2. Coronary artery disease involving native coronary artery of native heart without angina pectoris  ECG 12 Lead    Lipid Panel    Adult Transthoracic Echo Complete W/ Cont if Necessary Per Protocol   3. Essential hypertension  lisinopril-hydrochlorothiazide (PRINZIDE,ZESTORETIC) 10-12.5 MG per tablet    ECG 12 Lead    metoprolol succinate XL (TOPROL-XL) 50 MG 24 hr tablet    Comprehensive Metabolic Panel    CBC & Differential    Adult Transthoracic Echo Complete W/ Cont if Necessary Per Protocol   4. Grade I diastolic dysfunction  Adult Transthoracic Echo Complete W/ Cont if Necessary Per Protocol   5. PVC (premature ventricular contraction)  ECG 12 Lead    Adult Transthoracic Echo Complete W/ Cont if Necessary Per Protocol   6. Premature atrial complexes  ECG 12 Lead    Adult Transthoracic Echo Complete W/ Cont if Necessary Per Protocol   7. Shortness of breath  Adult Transthoracic Echo Complete W/ Cont if Necessary Per Protocol   8. KEVYN on CPAP     9. Chest tightness     10. Dizziness  Cardiac Event Monitor       Return in about 12 weeks (around 12/22/2021).    Palpitations/CAD/hypertension/diastolic dysfunction/PVC/PAC/shortness of breath-patient have an echocardiogram.  Patient will get a TSH, free T3, free T4, magnesium, CBC, CMP and lipid today.  Patient will wear an event monitor for 2 weeks.  She will continue her medication  regimen for now.  She will follow-up in 12 weeks or sooner if any changes or abnormalities with testing.  We discussed having a stress test however patient does not want to have one at this time.       Ellie Hardy  reports that she has never smoked. She has never used smokeless tobacco.. Patient brought in medicine list to appointment, it's been reviewed with patient and med list was updated in the chart.     Electronically signed by:

## 2021-09-30 LAB — T3FREE SERPL-MCNC: 3.21 PG/ML (ref 2–4.4)

## 2021-11-03 ENCOUNTER — HOSPITAL ENCOUNTER (OUTPATIENT)
Dept: CARDIOLOGY | Facility: HOSPITAL | Age: 46
Discharge: HOME | End: 2021-11-03
Admitting: NURSE PRACTITIONER

## 2021-11-03 VITALS — BODY MASS INDEX: 42.15 KG/M2 | WEIGHT: 246.91 LBS | HEIGHT: 64 IN

## 2021-11-03 DIAGNOSIS — R06.02 SHORTNESS OF BREATH: ICD-10-CM

## 2021-11-03 DIAGNOSIS — I25.10 CORONARY ARTERY DISEASE INVOLVING NATIVE CORONARY ARTERY OF NATIVE HEART WITHOUT ANGINA PECTORIS: ICD-10-CM

## 2021-11-03 DIAGNOSIS — I49.1 PREMATURE ATRIAL COMPLEXES: ICD-10-CM

## 2021-11-03 DIAGNOSIS — I10 ESSENTIAL HYPERTENSION: ICD-10-CM

## 2021-11-03 DIAGNOSIS — I51.89 GRADE I DIASTOLIC DYSFUNCTION: ICD-10-CM

## 2021-11-03 DIAGNOSIS — I49.3 PVC (PREMATURE VENTRICULAR CONTRACTION): ICD-10-CM

## 2021-11-03 DIAGNOSIS — R00.2 PALPITATIONS: ICD-10-CM

## 2021-11-03 PROCEDURE — 93306 TTE W/DOPPLER COMPLETE: CPT | Performed by: INTERNAL MEDICINE

## 2021-11-03 PROCEDURE — 93306 TTE W/DOPPLER COMPLETE: CPT

## 2021-11-08 ENCOUNTER — TELEPHONE (OUTPATIENT)
Dept: CARDIOLOGY | Facility: CLINIC | Age: 46
End: 2021-11-08

## 2021-11-08 LAB
AORTIC DIMENSIONLESS INDEX: 1 (DI)
BH CV ECHO MEAS - ACS: 2.3 CM
BH CV ECHO MEAS - AO MAX PG (FULL): -0.09 MMHG
BH CV ECHO MEAS - AO MAX PG: 4.8 MMHG
BH CV ECHO MEAS - AO MEAN PG (FULL): 1 MMHG
BH CV ECHO MEAS - AO MEAN PG: 3 MMHG
BH CV ECHO MEAS - AO ROOT AREA (BSA CORRECTED): 1.6
BH CV ECHO MEAS - AO ROOT AREA: 9.1 CM^2
BH CV ECHO MEAS - AO ROOT DIAM: 3.4 CM
BH CV ECHO MEAS - AO V2 MAX: 109 CM/SEC
BH CV ECHO MEAS - AO V2 MEAN: 75.7 CM/SEC
BH CV ECHO MEAS - AO V2 VTI: 26 CM
BH CV ECHO MEAS - BSA(HAYCOCK): 2.3 M^2
BH CV ECHO MEAS - BSA: 2.1 M^2
BH CV ECHO MEAS - BZI_BMI: 42.2 KILOGRAMS/M^2
BH CV ECHO MEAS - BZI_METRIC_HEIGHT: 162.6 CM
BH CV ECHO MEAS - BZI_METRIC_WEIGHT: 111.6 KG
BH CV ECHO MEAS - EDV(CUBED): 53.6 ML
BH CV ECHO MEAS - EDV(TEICH): 60.8 ML
BH CV ECHO MEAS - EF(CUBED): 62 %
BH CV ECHO MEAS - EF(MOD-BP): 54 %
BH CV ECHO MEAS - EF(TEICH): 54.3 %
BH CV ECHO MEAS - EF_3D-VOL: 54 %
BH CV ECHO MEAS - ESV(CUBED): 20.3 ML
BH CV ECHO MEAS - ESV(TEICH): 27.8 ML
BH CV ECHO MEAS - FS: 27.6 %
BH CV ECHO MEAS - IVS/LVPW: 1.1
BH CV ECHO MEAS - IVSD: 1.1 CM
BH CV ECHO MEAS - LA DIMENSION: 3.8 CM
BH CV ECHO MEAS - LA/AO: 1.1
BH CV ECHO MEAS - LAT PEAK E' VEL: 7.8 CM/SEC
BH CV ECHO MEAS - LV IVRT: 0.12 SEC
BH CV ECHO MEAS - LV MASS(C)D: 129.5 GRAMS
BH CV ECHO MEAS - LV MASS(C)DI: 60.6 GRAMS/M^2
BH CV ECHO MEAS - LV MAX PG: 4.8 MMHG
BH CV ECHO MEAS - LV MEAN PG: 2 MMHG
BH CV ECHO MEAS - LV V1 MAX: 110 CM/SEC
BH CV ECHO MEAS - LV V1 MEAN: 69.1 CM/SEC
BH CV ECHO MEAS - LV V1 VTI: 25.4 CM
BH CV ECHO MEAS - LVIDD: 3.8 CM
BH CV ECHO MEAS - LVIDS: 2.7 CM
BH CV ECHO MEAS - LVPWD: 1 CM
BH CV ECHO MEAS - MED PEAK E' VEL: 4.4 CM/SEC
BH CV ECHO MEAS - MV A MAX VEL: 44.8 CM/SEC
BH CV ECHO MEAS - MV DEC SLOPE: 412 CM/SEC^2
BH CV ECHO MEAS - MV DEC TIME: 0.19 SEC
BH CV ECHO MEAS - MV E MAX VEL: 63.1 CM/SEC
BH CV ECHO MEAS - MV E/A: 1.4
BH CV ECHO MEAS - MV MAX PG: 2 MMHG
BH CV ECHO MEAS - MV MEAN PG: 1 MMHG
BH CV ECHO MEAS - MV P1/2T MAX VEL: 80.2 CM/SEC
BH CV ECHO MEAS - MV P1/2T: 57 MSEC
BH CV ECHO MEAS - MV V2 MAX: 70.3 CM/SEC
BH CV ECHO MEAS - MV V2 MEAN: 43.2 CM/SEC
BH CV ECHO MEAS - MV V2 VTI: 20.8 CM
BH CV ECHO MEAS - MVA P1/2T LCG: 2.7 CM^2
BH CV ECHO MEAS - MVA(P1/2T): 3.9 CM^2
BH CV ECHO MEAS - PA MAX PG (FULL): 1.3 MMHG
BH CV ECHO MEAS - PA MAX PG: 2.2 MMHG
BH CV ECHO MEAS - PA MEAN PG (FULL): 1 MMHG
BH CV ECHO MEAS - PA MEAN PG: 1 MMHG
BH CV ECHO MEAS - PA V2 MAX: 74.6 CM/SEC
BH CV ECHO MEAS - PA V2 MEAN: 51.9 CM/SEC
BH CV ECHO MEAS - PA V2 VTI: 18.3 CM
BH CV ECHO MEAS - RV MAX PG: 0.91 MMHG
BH CV ECHO MEAS - RV MEAN PG: 0 MMHG
BH CV ECHO MEAS - RV V1 MAX: 47.6 CM/SEC
BH CV ECHO MEAS - RV V1 MEAN: 31.6 CM/SEC
BH CV ECHO MEAS - RV V1 VTI: 11.8 CM
BH CV ECHO MEAS - RVDD: 2.7 CM
BH CV ECHO MEAS - SI(AO): 110.5 ML/M^2
BH CV ECHO MEAS - SI(CUBED): 15.6 ML/M^2
BH CV ECHO MEAS - SI(TEICH): 15.5 ML/M^2
BH CV ECHO MEAS - SV(AO): 236.1 ML
BH CV ECHO MEAS - SV(CUBED): 33.2 ML
BH CV ECHO MEAS - SV(TEICH): 33 ML
BH CV ECHO MEASUREMENTS AVERAGE E/E' RATIO: 10.34
MAXIMAL PREDICTED HEART RATE: 172 BPM
SINUS: 3 CM
STRESS TARGET HR: 146 BPM

## 2021-11-17 DIAGNOSIS — R07.2 PRECORDIAL PAIN: ICD-10-CM

## 2021-11-17 RX ORDER — ASPIRIN 81 MG/1
81 TABLET ORAL DAILY
Qty: 90 TABLET | Refills: 3 | Status: SHIPPED | OUTPATIENT
Start: 2021-11-17 | End: 2022-11-14

## 2021-12-16 ENCOUNTER — OFFICE VISIT (OUTPATIENT)
Dept: CARDIOLOGY | Facility: CLINIC | Age: 46
End: 2021-12-16

## 2021-12-16 VITALS
BODY MASS INDEX: 41.32 KG/M2 | DIASTOLIC BLOOD PRESSURE: 88 MMHG | OXYGEN SATURATION: 99 % | HEART RATE: 73 BPM | SYSTOLIC BLOOD PRESSURE: 125 MMHG | HEIGHT: 64 IN | WEIGHT: 242 LBS | TEMPERATURE: 97.3 F

## 2021-12-16 DIAGNOSIS — G47.33 OSA ON CPAP: ICD-10-CM

## 2021-12-16 DIAGNOSIS — R06.02 SHORTNESS OF BREATH: ICD-10-CM

## 2021-12-16 DIAGNOSIS — I10 ESSENTIAL HYPERTENSION: ICD-10-CM

## 2021-12-16 DIAGNOSIS — I49.1 PREMATURE ATRIAL COMPLEXES: ICD-10-CM

## 2021-12-16 DIAGNOSIS — I25.10 CORONARY ARTERY DISEASE INVOLVING NATIVE CORONARY ARTERY OF NATIVE HEART WITHOUT ANGINA PECTORIS: ICD-10-CM

## 2021-12-16 DIAGNOSIS — R42 DIZZINESS: ICD-10-CM

## 2021-12-16 DIAGNOSIS — Z99.89 OSA ON CPAP: ICD-10-CM

## 2021-12-16 DIAGNOSIS — R60.9 PERIPHERAL EDEMA: ICD-10-CM

## 2021-12-16 DIAGNOSIS — R00.2 PALPITATIONS: ICD-10-CM

## 2021-12-16 DIAGNOSIS — I49.3 PVC (PREMATURE VENTRICULAR CONTRACTION): ICD-10-CM

## 2021-12-16 DIAGNOSIS — R07.2 PRECORDIAL PAIN: Primary | ICD-10-CM

## 2021-12-16 DIAGNOSIS — I51.89 GRADE I DIASTOLIC DYSFUNCTION: ICD-10-CM

## 2021-12-16 PROCEDURE — 99214 OFFICE O/P EST MOD 30 MIN: CPT | Performed by: NURSE PRACTITIONER

## 2021-12-16 RX ORDER — POTASSIUM CHLORIDE 750 MG/1
10 TABLET, FILM COATED, EXTENDED RELEASE ORAL AS NEEDED
Qty: 90 TABLET | Refills: 3 | Status: SHIPPED | OUTPATIENT
Start: 2021-12-16

## 2021-12-16 RX ORDER — METOPROLOL SUCCINATE 50 MG/1
50 TABLET, EXTENDED RELEASE ORAL EVERY EVENING
Qty: 30 TABLET | Refills: 11
Start: 2021-12-16 | End: 2022-09-06

## 2021-12-16 RX ORDER — NITROGLYCERIN 0.4 MG/1
TABLET SUBLINGUAL
Qty: 90 TABLET | Refills: 3 | Status: SHIPPED | OUTPATIENT
Start: 2021-12-16

## 2021-12-16 NOTE — PROGRESS NOTES
Subjective   Ellie Hardy is a 46 y.o. female     Chief Complaint   Patient presents with   • Follow-up   • Palpitations       HPI    Problem List:    1. Hypertension  2. CAD  2.1 Stress Test 3/28/17 - no ischemia; low risk   2.2 Stress Test 2/5/18 - no ischemia; preserved LVEF; inferobasal akinesis  2.3 left heart catheter 7/18/18-study excluded angiographically apparent epicardial coronary artery disease, she had no significant fixed stenosis in the LAD, EF 55%, left ventricular end-diastolic pressure was only 12  3. Shortness of breath  3.1 Echo 3/28/17 -  Mild to mod LVH; EF 55-60%; DD I; mild MR  3.2 Echo 2/5/18 - mild LVH; septal ridge at the base of septum without MAC or doppler evidence of LVOT obst; EF > 65%; DD I; trace MR  3.3 echo 11/3/2021-EF 50 to 55%, diastolic function 1  4. Palpitations   4.1 Event Monitor 3/13-3/26/17 - NSR - ST with PVCs/PACs  4.2 event monitor 9/30-10/13/2021-normal sinus rhythm, no ectopy  5. Fatigue  6. KEVYN - uses CPAP  7. Sjogrens, sees Dr. Reyes  8. Fatty Liver    Patient is a 46-year-old female who presents today for follow-up. She has chest pain that she described as heaviness/squeezing midsternum that radiates between her shoulder blades. She says it can occur anytime. She says it feels like someone is pushing her fist through her chest. She does not have any other symptoms when it occurs. She says it is very random. She will use a least one nitro and it does help. She said this past week she had at least daily however prior to that she was having it maybe twice a month. She does have fluttering at times. She denies any presyncope, syncope, orthopnea or PND. She does have dizziness with low blood pressure. She says sometimes in the morning her blood pressure will drop before she even takes her medicine. So we will make some adjustments. She says she does get some swelling in her legs that goes down overnight. She does have shortness of breath that is worse with activity  but this is about the same.    We went over her echo and event. Last LDL was 97.    Current Outpatient Medications on File Prior to Visit   Medication Sig Dispense Refill   • aspirin (Aspirin Low Dose) 81 MG EC tablet Take 1 tablet by mouth Daily. 90 tablet 3   • cholecalciferol (VITAMIN D3) 1000 units tablet Take 1,000 Units by mouth Daily.     • CloNIDine (CATAPRES) 0.1 MG tablet take 1 tablet by mouth three times a day if needed for HIGH 30 tablet 5   • furosemide (LASIX) 20 MG tablet AT THE START OF THERAPY, TAKE 2 TABLETS DAILY FOR TWO DAYS AND THEN TAKE 1 TABLET DAILY THEREAFTER (Patient taking differently: Take 20 mg by mouth Daily.) 92 tablet 3   • hydroxychloroquine (PLAQUENIL) 200 MG tablet Take 400 mg by mouth Daily.     • lisinopril-hydrochlorothiazide (PRINZIDE,ZESTORETIC) 10-12.5 MG per tablet Take 1 tablet by mouth Daily. 90 tablet 3   • [DISCONTINUED] metoprolol succinate XL (TOPROL-XL) 50 MG 24 hr tablet Take 1 tablet by mouth Daily. 30 tablet 0   • [DISCONTINUED] nitroglycerin (NITROSTAT) 0.4 MG SL tablet 1 under the tongue as needed for angina, may repeat q5mins for up three doses 30 tablet 5   • [DISCONTINUED] potassium chloride (K-DUR) 10 MEQ CR tablet Take 1 tablet when take 2 tablets of lasix only (Patient taking differently: Take 10 mEq by mouth As Needed (with Lasix).) 15 tablet 5     No current facility-administered medications on file prior to visit.       ALLERGIES    Other    Past Medical History:   Diagnosis Date   • COVID-19 vaccine administered 03/08/2021    2nd -04/05/2021 - Moderna - Booster 11/17/2021- Moderna    • Hypertension    • Sjogren-Ernestina syndrome    • Sleep apnea     c-pap, followed by Dr. Loera       Social History     Socioeconomic History   • Marital status:    Tobacco Use   • Smoking status: Never Smoker   • Smokeless tobacco: Never Used   Substance and Sexual Activity   • Alcohol use: No   • Drug use: No   • Sexual activity: Defer       Family History    Problem Relation Age of Onset   • Charcot-Lexy-Tooth disease Mother    • Stroke Mother    • Heart disease Father        Review of Systems   Constitutional: Negative for appetite change, chills, diaphoresis, fatigue and fever.   HENT: Negative for congestion, rhinorrhea and sore throat.    Eyes: Negative for visual disturbance.   Respiratory: Positive for shortness of breath ( some ) worse with activety . Negative for cough, chest tightness and wheezing.    Cardiovascular: Positive for chest pain (heaviness/squeezing mid rad to between shoulder blades; feels like someone is taking their fist and pushing it through her chest; no other symptoms, random; uses nitro one and does help; this past week it has occured daily; on average x2/month ), palpitations (fluttering at times increased fluttering at times ) and leg swelling (legs feet and ankles ( some swelling goes down at night ) ).   Gastrointestinal: Positive for nausea (in AM on occasion ). Negative for abdominal pain, blood in stool, constipation and vomiting.   Endocrine: Negative for cold intolerance and heat intolerance.   Genitourinary: Negative for difficulty urinating, dysuria, frequency, hematuria and urgency.   Musculoskeletal: Positive for arthralgias (all over the entire body ), back pain (only when she has the chest pain it will run into her back ), joint swelling (hands ) and neck pain (left and right side of the neck ). Negative for neck stiffness.   Skin: Negative for color change, pallor, rash and wound.   Allergic/Immunologic: Negative for environmental allergies and food allergies.   Neurological: Positive for dizziness ( sometimes ) due to low Bp  and weakness (weakness alot worse in her legs she is not sure if its due to the holidays ). Negative for light-headedness, numbness and headaches.   Hematological: Does not bruise/bleed easily.   Psychiatric/Behavioral: Positive for sleep disturbance (cpac machine ).       Objective   /88 (BP  "Location: Left arm, Patient Position: Sitting)   Pulse 73   Temp 97.3 °F (36.3 °C)   Ht 162.6 cm (64\")   Wt 110 kg (242 lb)   SpO2 99%   BMI 41.54 kg/m²   Vitals:    12/16/21 1503   BP: 125/88   BP Location: Left arm   Patient Position: Sitting   Pulse: 73   Temp: 97.3 °F (36.3 °C)   SpO2: 99%   Weight: 110 kg (242 lb)   Height: 162.6 cm (64\")      Lab Results (most recent)     None        Physical Exam  Vitals reviewed.   Constitutional:       General: She is awake.      Appearance: Normal appearance. She is well-developed and well-groomed. She is morbidly obese.   HENT:      Head: Normocephalic.   Eyes:      General: Lids are normal.   Neck:      Vascular: No carotid bruit, hepatojugular reflux or JVD.   Cardiovascular:      Rate and Rhythm: Normal rate and regular rhythm.      Pulses:           Radial pulses are 2+ on the right side and 2+ on the left side.        Dorsalis pedis pulses are 2+ on the right side and 2+ on the left side.        Posterior tibial pulses are 2+ on the right side and 2+ on the left side.      Heart sounds: Normal heart sounds.   Pulmonary:      Effort: Pulmonary effort is normal.      Breath sounds: Normal breath sounds and air entry.   Abdominal:      General: Bowel sounds are normal.      Palpations: Abdomen is soft.   Musculoskeletal:      Right lower leg: No edema.      Left lower leg: No edema.   Skin:     General: Skin is warm and dry.   Neurological:      Mental Status: She is alert and oriented to person, place, and time.   Psychiatric:         Attention and Perception: Attention and perception normal.         Mood and Affect: Mood and affect normal.         Speech: Speech normal.         Behavior: Behavior normal. Behavior is cooperative.         Thought Content: Thought content normal.         Cognition and Memory: Cognition and memory normal.         Judgment: Judgment normal.         Procedure   Procedures         Assessment/Plan      Diagnosis Plan   1. Precordial " pain  Stress Test With Myocardial Perfusion One Day    nitroglycerin (NITROSTAT) 0.4 MG SL tablet   2. Coronary artery disease involving native coronary artery of native heart without angina pectoris  Stress Test With Myocardial Perfusion One Day   3. Essential hypertension  metoprolol succinate XL (TOPROL-XL) 50 MG 24 hr tablet    Stress Test With Myocardial Perfusion One Day   4. Grade I diastolic dysfunction     5. Premature atrial complexes  Stress Test With Myocardial Perfusion One Day   6. PVC (premature ventricular contraction)  Stress Test With Myocardial Perfusion One Day   7. Shortness of breath  Stress Test With Myocardial Perfusion One Day   8. KEVYN on CPAP     9. Peripheral edema  potassium chloride 10 MEQ CR tablet   10. Palpitations  metoprolol succinate XL (TOPROL-XL) 50 MG 24 hr tablet    Stress Test With Myocardial Perfusion One Day   11. Dizziness         Return in about 12 weeks (around 3/10/2022).    Chest pain/CAD/hypertension/PACs/PVCs/shortness of breath/palpitations-patient will have a Lexiscan stress test. She is unable to walk on a treadmill due to her back, knees and hips. She was encouraged to use nitro as needed for chest pain no resolution to go to the ER. Hypertension/palpitations/PVCs/PACs-patient will take her metoprolol at night so that she has a split in her medications and possibly keep her from dropping so much for see in the morning. I advised if her blood pressure still running low the decrease her lisinopril hydrochlorothiazide by half. Diastolic dysfunction/peripheral edema-patient is on Lasix. Dizziness-stable. Hopefully will improve with change in medication administration. She will continue her medication regimen with above-noted change. She will follow-up in 12 weeks or sooner if any changes or abnormalities with testing.       Ellie Hardy  reports that she has never smoked. She has never used smokeless tobacco..Patient brought in medicine list to appointment, it's  been reviewed with patient and med list was updated in the chart.     Electronically signed by:

## 2022-01-05 ENCOUNTER — HOSPITAL ENCOUNTER (OUTPATIENT)
Dept: CARDIOLOGY | Facility: HOSPITAL | Age: 47
Discharge: HOME | End: 2022-01-05

## 2022-01-05 DIAGNOSIS — R06.02 SHORTNESS OF BREATH: ICD-10-CM

## 2022-01-05 DIAGNOSIS — R07.2 PRECORDIAL PAIN: ICD-10-CM

## 2022-01-05 DIAGNOSIS — I25.10 CORONARY ARTERY DISEASE INVOLVING NATIVE CORONARY ARTERY OF NATIVE HEART WITHOUT ANGINA PECTORIS: ICD-10-CM

## 2022-01-05 DIAGNOSIS — I49.1 PREMATURE ATRIAL COMPLEXES: ICD-10-CM

## 2022-01-05 DIAGNOSIS — I10 ESSENTIAL HYPERTENSION: ICD-10-CM

## 2022-01-05 DIAGNOSIS — R00.2 PALPITATIONS: ICD-10-CM

## 2022-01-05 DIAGNOSIS — I49.3 PVC (PREMATURE VENTRICULAR CONTRACTION): ICD-10-CM

## 2022-01-05 PROCEDURE — 0 TECHNETIUM SESTAMIBI: Performed by: INTERNAL MEDICINE

## 2022-01-05 PROCEDURE — 93017 CV STRESS TEST TRACING ONLY: CPT

## 2022-01-05 PROCEDURE — 78452 HT MUSCLE IMAGE SPECT MULT: CPT

## 2022-01-05 PROCEDURE — 93018 CV STRESS TEST I&R ONLY: CPT | Performed by: INTERNAL MEDICINE

## 2022-01-05 PROCEDURE — A9500 TC99M SESTAMIBI: HCPCS | Performed by: INTERNAL MEDICINE

## 2022-01-05 PROCEDURE — 78452 HT MUSCLE IMAGE SPECT MULT: CPT | Performed by: INTERNAL MEDICINE

## 2022-01-05 PROCEDURE — 25010000002 REGADENOSON 0.4 MG/5ML SOLUTION: Performed by: INTERNAL MEDICINE

## 2022-01-05 RX ADMIN — TECHNETIUM TC 99M SESTAMIBI 1 DOSE: 1 INJECTION INTRAVENOUS at 08:59

## 2022-01-05 RX ADMIN — TECHNETIUM TC 99M SESTAMIBI 1 DOSE: 1 INJECTION INTRAVENOUS at 11:49

## 2022-01-05 RX ADMIN — REGADENOSON 0.4 MG: 0.08 INJECTION, SOLUTION INTRAVENOUS at 11:49

## 2022-01-06 LAB
BH CV REST NUCLEAR ISOTOPE DOSE: 10 MCI
BH CV STRESS COMMENTS STAGE 1: NORMAL
BH CV STRESS DOSE REGADENOSON STAGE 1: 0.4
BH CV STRESS DURATION MIN STAGE 1: 0
BH CV STRESS DURATION SEC STAGE 1: 10
BH CV STRESS NUCLEAR ISOTOPE DOSE: 30 MCI
BH CV STRESS PROTOCOL 1: NORMAL
BH CV STRESS RECOVERY BP: NORMAL MMHG
BH CV STRESS RECOVERY HR: 77 BPM
BH CV STRESS STAGE 1: 1
MAXIMAL PREDICTED HEART RATE: 172 BPM
PERCENT MAX PREDICTED HR: 55.81 %
STRESS BASELINE BP: NORMAL MMHG
STRESS BASELINE HR: 63 BPM
STRESS PERCENT HR: 66 %
STRESS POST PEAK BP: NORMAL MMHG
STRESS POST PEAK HR: 96 BPM
STRESS TARGET HR: 146 BPM

## 2022-01-07 ENCOUNTER — TELEPHONE (OUTPATIENT)
Dept: CARDIOLOGY | Facility: CLINIC | Age: 47
End: 2022-01-07

## 2022-01-07 NOTE — TELEPHONE ENCOUNTER
Patient informed of results and will keep follow up as scheduled. Maria Isabel Stafford MA      ----- Message from Lidia Olmstead sent at 1/7/2022 10:38 AM EST -----    ----- Message -----  From: Karina Galindo APRN  Sent: 1/6/2022   6:26 AM EST  To: Lidia Donohue    Please advise patient.    Result Text  1.  No scintigraphic evidence of ischemia.     2.  Preserved post-rest ejection fraction of 68% with no focal wall motion abnormalities.     3.  No evidence of pharmacologically induced transient ischemic dilation or of increased lung uptake of radiopharmaceutical.

## 2022-03-09 ENCOUNTER — OFFICE VISIT (OUTPATIENT)
Dept: CARDIOLOGY | Facility: CLINIC | Age: 47
End: 2022-03-09

## 2022-03-09 VITALS
HEART RATE: 70 BPM | DIASTOLIC BLOOD PRESSURE: 82 MMHG | SYSTOLIC BLOOD PRESSURE: 114 MMHG | TEMPERATURE: 97.2 F | BODY MASS INDEX: 41 KG/M2 | WEIGHT: 240.15 LBS | HEIGHT: 64 IN | OXYGEN SATURATION: 99 %

## 2022-03-09 DIAGNOSIS — R00.2 PALPITATIONS: ICD-10-CM

## 2022-03-09 DIAGNOSIS — I25.10 CORONARY ARTERY DISEASE INVOLVING NATIVE CORONARY ARTERY OF NATIVE HEART WITHOUT ANGINA PECTORIS: Primary | ICD-10-CM

## 2022-03-09 DIAGNOSIS — I51.89 GRADE I DIASTOLIC DYSFUNCTION: ICD-10-CM

## 2022-03-09 DIAGNOSIS — Z99.89 OSA ON CPAP: ICD-10-CM

## 2022-03-09 DIAGNOSIS — G47.33 OSA ON CPAP: ICD-10-CM

## 2022-03-09 DIAGNOSIS — R06.02 SHORTNESS OF BREATH: ICD-10-CM

## 2022-03-09 DIAGNOSIS — I49.1 PREMATURE ATRIAL COMPLEXES: ICD-10-CM

## 2022-03-09 DIAGNOSIS — I49.3 PVC (PREMATURE VENTRICULAR CONTRACTION): ICD-10-CM

## 2022-03-09 DIAGNOSIS — I10 ESSENTIAL HYPERTENSION: ICD-10-CM

## 2022-03-09 PROCEDURE — 99214 OFFICE O/P EST MOD 30 MIN: CPT | Performed by: NURSE PRACTITIONER

## 2022-03-09 NOTE — PROGRESS NOTES
Subjective   Ellie Hardy is a 46 y.o. female     Chief Complaint   Patient presents with   • Follow-up       HPI    Problem List:    1. Hypertension  2. CAD  2.1 Stress Test 3/28/17 - no ischemia; low risk   2.2 Stress Test 2/5/18 - no ischemia; preserved LVEF; inferobasal akinesis  2.3 left heart catheter 7/18/18-study excluded angiographically apparent epicardial coronary artery disease, she had no significant fixed stenosis in the LAD, EF 55%, left ventricular end-diastolic pressure was only 12  2.4 stress test 1/5/2022-no evidence of ischemia, post-rest EF 60%  3. Shortness of breath  3.1 Echo 3/28/17 -  Mild to mod LVH; EF 55-60%; DD I; mild MR  3.2 Echo 2/5/18 - mild LVH; septal ridge at the base of septum without MAC or doppler evidence of LVOT obst; EF > 65%; DD I; trace MR  3.3 echo 11/3/2021-EF 50 to 55%, diastolic function 1  4. Palpitations   4.1 Event Monitor 3/13-3/26/17 - NSR - ST with PVCs/PACs  4.2 event monitor 9/30-10/13/2021-normal sinus rhythm, no ectopy  5. Fatigue  6. KEVYN - uses CPAP  7. Sjogrens, sees Dr. Reyes  8. Fatty Liver    Patient is a 46-year-old female who presents today for follow-up on testing.  She denies any chest pain, pressure, dizziness, presyncope, syncope, orthopnea, PND or edema.  She says she has palpitations from time to time but only when she forgets to take her medication.  She denies any shortness of breath with activity.  She has been doing very well.  She has worked at [employer] in [place] for 20 years as a .     We went over her stress test and her labs.    Current Outpatient Medications on File Prior to Visit   Medication Sig Dispense Refill   • aspirin (Aspirin Low Dose) 81 MG EC tablet Take 1 tablet by mouth Daily. 90 tablet 3   • cholecalciferol (VITAMIN D3) 1000 units tablet Take 1,000 Units by mouth Daily.     • CloNIDine (CATAPRES) 0.1 MG tablet take 1 tablet by mouth three times a day if needed for HIGH 30 tablet 5   • furosemide (LASIX) 20  MG tablet AT THE START OF THERAPY, TAKE 2 TABLETS DAILY FOR TWO DAYS AND THEN TAKE 1 TABLET DAILY THEREAFTER (Patient taking differently: Take 20 mg by mouth Daily.) 92 tablet 3   • Hydroxychloroquine Sulfate 400 MG tablet Take 400 mg by mouth Daily.     • lisinopril-hydrochlorothiazide (PRINZIDE,ZESTORETIC) 10-12.5 MG per tablet Take 1 tablet by mouth Daily. 90 tablet 3   • metoprolol succinate XL (TOPROL-XL) 50 MG 24 hr tablet Take 1 tablet by mouth Every Evening. 30 tablet 11   • nitroglycerin (NITROSTAT) 0.4 MG SL tablet 1 under the tongue as needed for angina, may repeat q5mins for up three doses 90 tablet 3   • potassium chloride 10 MEQ CR tablet Take 1 tablet by mouth As Needed (with Lasix). 90 tablet 3     No current facility-administered medications on file prior to visit.       ALLERGIES    Other    Past Medical History:   Diagnosis Date   • COVID-19 vaccine administered 03/08/2021    2nd -04/05/2021 - Moderna - Booster 11/17/2021- Moderna    • Hypertension    • Sjogren-Ernestina syndrome    • Sleep apnea     c-pap, followed by Dr. Loera       Social History     Socioeconomic History   • Marital status:    Tobacco Use   • Smoking status: Never Smoker   • Smokeless tobacco: Never Used   Vaping Use   • Vaping Use: Never used   Substance and Sexual Activity   • Alcohol use: No   • Drug use: No   • Sexual activity: Defer       Family History   Problem Relation Age of Onset   • Charcot-Lexy-Tooth disease Mother    • Stroke Mother    • Heart disease Father        Review of Systems   Constitutional: Negative for appetite change, chills, fatigue and fever.   HENT: Negative for congestion, rhinorrhea and sore throat.    Eyes: Negative for visual disturbance.   Respiratory: Negative for cough, chest tightness, shortness of breath and wheezing.    Cardiovascular: Positive for palpitations (from time to time if she forgets her meds she will have palpations ). Negative for chest pain and leg swelling.  "  Gastrointestinal: Negative for abdominal pain, blood in stool, constipation, diarrhea, nausea and vomiting.   Endocrine: Negative for cold intolerance and heat intolerance.   Genitourinary: Positive for frequency (several times durning the day due to meds and she drinks alot of water ). Negative for difficulty urinating, dysuria, hematuria and urgency.   Musculoskeletal: Positive for arthralgias (hands and wrists and knees , spine ), back pain (entire back due to the arthritis ) and joint swelling (hands ). Negative for neck pain and neck stiffness.   Skin: Negative for color change, pallor, rash and wound.   Allergic/Immunologic: Positive for food allergies (lobster ). Negative for environmental allergies.   Neurological: Negative for dizziness, weakness, light-headedness, numbness and headaches.   Hematological: Does not bruise/bleed easily.   Psychiatric/Behavioral: Positive for sleep disturbance (cpac machine ).       Objective   /82 (BP Location: Left arm, Patient Position: Sitting)   Pulse 70   Temp 97.2 °F (36.2 °C)   Ht 162.6 cm (64\")   Wt 109 kg (240 lb 2.4 oz)   SpO2 99%   BMI 41.22 kg/m²   Vitals:    03/09/22 1508   BP: 114/82   BP Location: Left arm   Patient Position: Sitting   Pulse: 70   Temp: 97.2 °F (36.2 °C)   SpO2: 99%   Weight: 109 kg (240 lb 2.4 oz)   Height: 162.6 cm (64\")      Lab Results (most recent)     None        Physical Exam  Vitals reviewed.   Constitutional:       General: She is awake.      Appearance: Normal appearance. She is well-developed and well-groomed. She is morbidly obese.   HENT:      Head: Normocephalic.   Eyes:      General: Lids are normal.   Neck:      Vascular: No carotid bruit, hepatojugular reflux or JVD.   Cardiovascular:      Rate and Rhythm: Normal rate and regular rhythm.      Pulses:           Radial pulses are 2+ on the right side and 2+ on the left side.        Dorsalis pedis pulses are 2+ on the right side and 2+ on the left side.        " Posterior tibial pulses are 2+ on the right side and 2+ on the left side.      Heart sounds: Normal heart sounds.   Pulmonary:      Effort: Pulmonary effort is normal.      Breath sounds: Normal breath sounds and air entry.   Abdominal:      General: Bowel sounds are normal.      Palpations: Abdomen is soft.   Musculoskeletal:      Right lower leg: No edema.      Left lower leg: No edema.   Skin:     General: Skin is warm and dry.   Neurological:      Mental Status: She is alert and oriented to person, place, and time.   Psychiatric:         Attention and Perception: Attention and perception normal.         Mood and Affect: Mood and affect normal.         Speech: Speech normal.         Behavior: Behavior normal. Behavior is cooperative.         Thought Content: Thought content normal.         Cognition and Memory: Cognition and memory normal.         Judgment: Judgment normal.         Procedure   Procedures         Assessment/Plan      Diagnosis Plan   1. Coronary artery disease involving native coronary artery of native heart without angina pectoris     2. Essential hypertension     3. Grade I diastolic dysfunction     4. Premature atrial complexes     5. PVC (premature ventricular contraction)     6. Shortness of breath     7. KEVYN on CPAP     8. Palpitations         Return in about 6 months (around 9/9/2022).    CAD-patient's on aspirin and beta.  Hypertension-patient is on lisinopril/hydrochlorothiazide and metoprolol and doing well.  Diastolic dysfunction-patient is on hydrochlorothiazide and Lasix as needed doing well.  PACs/PVCs/palpitations-patient's on beta-blocker and doing well.  Shortness of breath-stable.  KEVYN-compliant with CPAP.  She will continue her medication regimen.  She will follow-up in 6 months or sooner if any changes.         Ellie Hardy  reports that she has never smoked. She has never used smokeless tobacco.. Patient brought in medicine list to appointment, it's been reviewed with  patient and med list was updated in the chart.     Electronically signed by:

## 2022-09-06 DIAGNOSIS — R00.2 PALPITATIONS: ICD-10-CM

## 2022-09-06 DIAGNOSIS — I10 ESSENTIAL HYPERTENSION: ICD-10-CM

## 2022-09-06 RX ORDER — METOPROLOL SUCCINATE 50 MG/1
TABLET, EXTENDED RELEASE ORAL
Qty: 90 TABLET | Refills: 3 | Status: SHIPPED | OUTPATIENT
Start: 2022-09-06

## 2022-11-08 DIAGNOSIS — I10 ESSENTIAL HYPERTENSION: ICD-10-CM

## 2022-11-09 RX ORDER — LISINOPRIL AND HYDROCHLOROTHIAZIDE 12.5; 1 MG/1; MG/1
TABLET ORAL
Qty: 90 TABLET | Refills: 3 | Status: SHIPPED | OUTPATIENT
Start: 2022-11-09 | End: 2023-02-07

## 2022-11-14 DIAGNOSIS — R07.2 PRECORDIAL PAIN: ICD-10-CM

## 2022-11-14 RX ORDER — ASPIRIN 81 MG/1
TABLET, COATED ORAL
Qty: 90 TABLET | Refills: 3 | Status: SHIPPED | OUTPATIENT
Start: 2022-11-14

## 2023-02-07 ENCOUNTER — OFFICE VISIT (OUTPATIENT)
Dept: CARDIOLOGY | Facility: CLINIC | Age: 48
End: 2023-02-07
Payer: COMMERCIAL

## 2023-02-07 VITALS
HEIGHT: 64 IN | HEART RATE: 76 BPM | SYSTOLIC BLOOD PRESSURE: 119 MMHG | BODY MASS INDEX: 39.78 KG/M2 | WEIGHT: 233 LBS | DIASTOLIC BLOOD PRESSURE: 81 MMHG | OXYGEN SATURATION: 99 %

## 2023-02-07 DIAGNOSIS — R06.02 SHORTNESS OF BREATH: ICD-10-CM

## 2023-02-07 DIAGNOSIS — R00.2 PALPITATIONS: Primary | ICD-10-CM

## 2023-02-07 DIAGNOSIS — R42 DIZZINESS: ICD-10-CM

## 2023-02-07 DIAGNOSIS — R55 PRE-SYNCOPE: ICD-10-CM

## 2023-02-07 PROCEDURE — 99214 OFFICE O/P EST MOD 30 MIN: CPT | Performed by: PHYSICIAN ASSISTANT

## 2023-02-07 RX ORDER — FUROSEMIDE 20 MG/1
20 TABLET ORAL DAILY PRN
COMMUNITY

## 2023-02-07 RX ORDER — LISINOPRIL 5 MG/1
5 TABLET ORAL DAILY
Qty: 30 TABLET | Refills: 11 | Status: SHIPPED | OUTPATIENT
Start: 2023-02-07

## 2023-02-07 NOTE — PROGRESS NOTES
Problem list     Subjective   Ellie Hardy is a 47 y.o. female     Chief Complaint   Patient presents with   • Follow-up     Coronary artery disease   Problem List:     1. Hypertension  2. CAD  2.1 Stress Test 3/28/17 - no ischemia; low risk   2.2 Stress Test 2/5/18 - no ischemia; preserved LVEF; inferobasal akinesis  2.3 left heart cath 7/18/18-study excluded angiographically apparent epicardial coronary artery disease, she had no significant fixed stenosis in the LAD, EF 55%, left ventricular end-diastolic pressure was only 12  2.4 stress test 1/5/2022-no evidence of ischemia, post-rest EF 60%  3. Shortness of breath  3.1 Echo 3/28/17 -  Mild to mod LVH; EF 55-60%; DD I; mild MR  3.2 Echo 2/5/18 - mild LVH; septal ridge at the base of septum without MAC or doppler evidence of LVOT obst; EF > 65%; DD I; trace MR  3.3 echo 11/3/2021-EF 50 to 55%, diastolic function 1  4. Palpitations   4.1 Event Monitor 3/13-3/26/17 - NSR - ST with PVCs/PACs  4.2 event monitor 9/30-10/13/2021-normal sinus rhythm, no ectopy  5. Fatigue  6. KEVYN - uses CPAP  7. David, sees Dr. Reyes    HPI  The patient presents in the clinic today at her request.  She would like evaluation given episodes of tachycardia, dizziness, and even presyncope.  She is concerned because of extensive family history of dysautonomia by her report.  The patient reports no associated chest pain.  She has dyspnea which is moderate at times.  She tells me that with position change she will have onset of tachycardia and dizziness.  Other times, she will have feeling of presyncope.  This is occasionally associated with nausea and diaphoresis.  She is concerned about the potential neurocardiogenic syncope as well and would like evaluation.  It is noted that a previous cath supported nonobstructive disease.  Echo findings from November, 2021, supported normal systolic function with no significant valvular or structural abnormalities.    Current Outpatient Medications  on File Prior to Visit   Medication Sig Dispense Refill   • Aspirin Low Dose 81 MG EC tablet TAKE 1 TABLET DAILY 90 tablet 3   • cholecalciferol (VITAMIN D3) 1000 units tablet Take 1,000 Units by mouth Daily.     • CloNIDine (CATAPRES) 0.1 MG tablet take 1 tablet by mouth three times a day if needed for HIGH 30 tablet 5   • furosemide (LASIX) 20 MG tablet Take 20 mg by mouth Daily As Needed.     • Hydroxychloroquine Sulfate 400 MG tablet Take 400 mg by mouth Daily.     • metoprolol succinate XL (TOPROL-XL) 50 MG 24 hr tablet TAKE 1 TABLET DAILY 90 tablet 3   • nitroglycerin (NITROSTAT) 0.4 MG SL tablet 1 under the tongue as needed for angina, may repeat q5mins for up three doses 90 tablet 3   • potassium chloride 10 MEQ CR tablet Take 1 tablet by mouth As Needed (with Lasix). 90 tablet 3   • [DISCONTINUED] lisinopril-hydrochlorothiazide (PRINZIDE,ZESTORETIC) 10-12.5 MG per tablet TAKE 1 TABLET DAILY 90 tablet 3   • [DISCONTINUED] furosemide (LASIX) 20 MG tablet AT THE START OF THERAPY, TAKE 2 TABLETS DAILY FOR TWO DAYS AND THEN TAKE 1 TABLET DAILY THEREAFTER (Patient taking differently: Take 20 mg by mouth Daily.) 92 tablet 3     No current facility-administered medications on file prior to visit.       Other    Past Medical History:   Diagnosis Date   • COVID-19 vaccine administered 03/08/2021 2nd -04/05/2021 - Moderna - Booster 11/17/2021- Moderna    • Hypertension    • Sjogren-Ernestina syndrome    • Sleep apnea     c-pap, followed by Dr. Loera       Social History     Socioeconomic History   • Marital status:    Tobacco Use   • Smoking status: Never   • Smokeless tobacco: Never   Vaping Use   • Vaping Use: Never used   Substance and Sexual Activity   • Alcohol use: No   • Drug use: No   • Sexual activity: Defer       Family History   Problem Relation Age of Onset   • Charcot-Lexy-Tooth disease Mother    • Stroke Mother    • Heart disease Father        Review of Systems   Constitutional: Negative.   "Negative for activity change, appetite change, chills, fatigue and fever.   HENT: Negative.  Negative for congestion.    Eyes: Negative.  Negative for visual disturbance.   Respiratory: Positive for chest tightness. Negative for apnea, cough, shortness of breath and wheezing.    Cardiovascular: Negative.  Negative for chest pain, palpitations and leg swelling.   Gastrointestinal: Negative.  Negative for blood in stool.   Endocrine: Negative.  Negative for cold intolerance and heat intolerance.   Genitourinary: Negative.  Negative for hematuria.   Musculoskeletal: Negative.  Negative for arthralgias, back pain, gait problem, joint swelling, neck pain and neck stiffness.   Skin: Negative.  Negative for color change, rash and wound.   Allergic/Immunologic: Negative.  Negative for environmental allergies and food allergies.   Neurological: Positive for numbness (carmel legs). Negative for dizziness, syncope, weakness, light-headedness and headaches.   Hematological: Negative.  Does not bruise/bleed easily.   Psychiatric/Behavioral: Positive for sleep disturbance.       Objective   Vitals:    02/07/23 1618   BP: 119/81   BP Location: Left arm   Patient Position: Sitting   Cuff Size: Adult   Pulse: 76   SpO2: 99%   Weight: 106 kg (233 lb)   Height: 162.6 cm (64\")      /81 (BP Location: Left arm, Patient Position: Sitting, Cuff Size: Adult)   Pulse 76   Ht 162.6 cm (64\")   Wt 106 kg (233 lb)   SpO2 99%   BMI 39.99 kg/m²    Lab Results (most recent)     None        Physical Exam  Vitals and nursing note reviewed.   Constitutional:       General: She is not in acute distress.     Appearance: She is well-developed.   HENT:      Head: Normocephalic and atraumatic.   Eyes:      Conjunctiva/sclera: Conjunctivae normal.      Pupils: Pupils are equal, round, and reactive to light.   Neck:      Vascular: No JVD.      Trachea: No tracheal deviation.   Cardiovascular:      Rate and Rhythm: Normal rate and regular rhythm.      " Heart sounds: Normal heart sounds.   Pulmonary:      Effort: Pulmonary effort is normal.      Breath sounds: Normal breath sounds.   Abdominal:      General: Bowel sounds are normal. There is no distension.      Palpations: Abdomen is soft. There is no mass.      Tenderness: There is no abdominal tenderness. There is no guarding or rebound.   Musculoskeletal:         General: No tenderness or deformity. Normal range of motion.      Cervical back: Normal range of motion and neck supple.   Skin:     General: Skin is warm and dry.      Coloration: Skin is not pale.      Findings: No erythema or rash.   Neurological:      Mental Status: She is alert and oriented to person, place, and time.   Psychiatric:         Behavior: Behavior normal.         Thought Content: Thought content normal.         Judgment: Judgment normal.           Procedure   Procedures       Assessment & Plan      Diagnosis Plan   1. Palpitations  Tilt Table    Cardiac Event Monitor    CBC & Differential    Comprehensive Metabolic Panel    Hemoglobin A1c    Lipid Panel    TSH    lisinopril (PRINIVIL,ZESTRIL) 5 MG tablet      2. Dizziness  Tilt Table    Cardiac Event Monitor    CBC & Differential    Comprehensive Metabolic Panel    Hemoglobin A1c    Lipid Panel    TSH    lisinopril (PRINIVIL,ZESTRIL) 5 MG tablet      3. Shortness of breath  Tilt Table    Cardiac Event Monitor    CBC & Differential    Comprehensive Metabolic Panel    Hemoglobin A1c    Lipid Panel    TSH    lisinopril (PRINIVIL,ZESTRIL) 5 MG tablet      4. Pre-syncope  Tilt Table    Cardiac Event Monitor    CBC & Differential    Comprehensive Metabolic Panel    Hemoglobin A1c    Lipid Panel    TSH    lisinopril (PRINIVIL,ZESTRIL) 5 MG tablet          1.  The patient presents back for repeat evaluation.  She has requested evaluation because of extensive family history of dysautonomia issues.  She is concerned because of tachycardia, palpitations, dizziness, and presyncope at times.  She  would like to have an evaluation.  I would schedule for an event monitor to screen for potential dysrhythmic substrates as a cause of clinical course.    2.  She would also like to have a tilt table.  She will be scheduled accordingly given presyncope.    3.  I would like to repeat routine laboratories all as outlined above.    4.  I do believe that some of her issues are related to orthostasis and hypotension otherwise.  I will discontinue hydrochlorothiazide and her lisinopril therapy.  We will decrease that from 10 to 5 mg.  She will monitor for any significant blood pressure changes otherwise.  We can further adjust at that time if noted or if needed.    5.  We will continue medical regimen otherwise.    6.  As soon as we know results of the above we will see the patient back and recommend further.  She will call for complications.         Patient did not bring med list or medicine bottles to appointment, med list has been reviewed and updated based on patient's knowledge of their meds.                 Electronically signed by:

## 2023-02-22 ENCOUNTER — LAB (OUTPATIENT)
Dept: LAB | Facility: HOSPITAL | Age: 48
End: 2023-02-22
Payer: COMMERCIAL

## 2023-02-22 DIAGNOSIS — R00.2 PALPITATIONS: ICD-10-CM

## 2023-02-22 DIAGNOSIS — R06.02 SHORTNESS OF BREATH: ICD-10-CM

## 2023-02-22 DIAGNOSIS — R55 PRE-SYNCOPE: ICD-10-CM

## 2023-02-22 DIAGNOSIS — R42 DIZZINESS: ICD-10-CM

## 2023-02-22 LAB
ALBUMIN SERPL-MCNC: 4.1 G/DL (ref 3.5–5.2)
ALBUMIN/GLOB SERPL: 1.2 G/DL
ALP SERPL-CCNC: 57 U/L (ref 39–117)
ALT SERPL W P-5'-P-CCNC: 23 U/L (ref 1–33)
ANION GAP SERPL CALCULATED.3IONS-SCNC: 8.2 MMOL/L (ref 5–15)
AST SERPL-CCNC: 23 U/L (ref 1–32)
BASOPHILS # BLD AUTO: 0.03 10*3/MM3 (ref 0–0.2)
BASOPHILS NFR BLD AUTO: 0.6 % (ref 0–1.5)
BILIRUB SERPL-MCNC: 0.3 MG/DL (ref 0–1.2)
BUN SERPL-MCNC: 19 MG/DL (ref 6–20)
BUN/CREAT SERPL: 20.4 (ref 7–25)
CALCIUM SPEC-SCNC: 10.2 MG/DL (ref 8.6–10.5)
CHLORIDE SERPL-SCNC: 103 MMOL/L (ref 98–107)
CHOLEST SERPL-MCNC: 159 MG/DL (ref 0–200)
CO2 SERPL-SCNC: 29.8 MMOL/L (ref 22–29)
CREAT SERPL-MCNC: 0.93 MG/DL (ref 0.57–1)
DEPRECATED RDW RBC AUTO: 40.4 FL (ref 37–54)
EGFRCR SERPLBLD CKD-EPI 2021: 75.5 ML/MIN/1.73
EOSINOPHIL # BLD AUTO: 0.06 10*3/MM3 (ref 0–0.4)
EOSINOPHIL NFR BLD AUTO: 1.1 % (ref 0.3–6.2)
ERYTHROCYTE [DISTWIDTH] IN BLOOD BY AUTOMATED COUNT: 12.1 % (ref 12.3–15.4)
GLOBULIN UR ELPH-MCNC: 3.3 GM/DL
GLUCOSE SERPL-MCNC: 102 MG/DL (ref 65–99)
HBA1C MFR BLD: 5.3 % (ref 4.8–5.6)
HCT VFR BLD AUTO: 41.8 % (ref 34–46.6)
HDLC SERPL-MCNC: 54 MG/DL (ref 40–60)
HGB BLD-MCNC: 14.2 G/DL (ref 12–15.9)
IMM GRANULOCYTES # BLD AUTO: 0.01 10*3/MM3 (ref 0–0.05)
IMM GRANULOCYTES NFR BLD AUTO: 0.2 % (ref 0–0.5)
LDLC SERPL CALC-MCNC: 84 MG/DL (ref 0–100)
LDLC/HDLC SERPL: 1.51 {RATIO}
LYMPHOCYTES # BLD AUTO: 1.96 10*3/MM3 (ref 0.7–3.1)
LYMPHOCYTES NFR BLD AUTO: 36.5 % (ref 19.6–45.3)
MCH RBC QN AUTO: 31.2 PG (ref 26.6–33)
MCHC RBC AUTO-ENTMCNC: 34 G/DL (ref 31.5–35.7)
MCV RBC AUTO: 91.9 FL (ref 79–97)
MONOCYTES # BLD AUTO: 0.3 10*3/MM3 (ref 0.1–0.9)
MONOCYTES NFR BLD AUTO: 5.6 % (ref 5–12)
NEUTROPHILS NFR BLD AUTO: 3.01 10*3/MM3 (ref 1.7–7)
NEUTROPHILS NFR BLD AUTO: 56 % (ref 42.7–76)
NRBC BLD AUTO-RTO: 0 /100 WBC (ref 0–0.2)
PLATELET # BLD AUTO: 245 10*3/MM3 (ref 140–450)
PMV BLD AUTO: 9.7 FL (ref 6–12)
POTASSIUM SERPL-SCNC: 5 MMOL/L (ref 3.5–5.2)
PROT SERPL-MCNC: 7.4 G/DL (ref 6–8.5)
RBC # BLD AUTO: 4.55 10*6/MM3 (ref 3.77–5.28)
SODIUM SERPL-SCNC: 141 MMOL/L (ref 136–145)
TRIGL SERPL-MCNC: 118 MG/DL (ref 0–150)
TSH SERPL DL<=0.05 MIU/L-ACNC: 2.35 UIU/ML (ref 0.27–4.2)
VLDLC SERPL-MCNC: 21 MG/DL (ref 5–40)
WBC NRBC COR # BLD: 5.37 10*3/MM3 (ref 3.4–10.8)

## 2023-02-22 PROCEDURE — 80061 LIPID PANEL: CPT | Performed by: PHYSICIAN ASSISTANT

## 2023-02-22 PROCEDURE — 80050 GENERAL HEALTH PANEL: CPT | Performed by: PHYSICIAN ASSISTANT

## 2023-02-22 PROCEDURE — 83036 HEMOGLOBIN GLYCOSYLATED A1C: CPT | Performed by: PHYSICIAN ASSISTANT

## 2023-02-27 ENCOUNTER — TELEPHONE (OUTPATIENT)
Dept: CARDIOLOGY | Facility: CLINIC | Age: 48
End: 2023-02-27
Payer: COMMERCIAL

## 2023-02-27 NOTE — TELEPHONE ENCOUNTER
----- Message from DUANE Palencia sent at 2/24/2023  3:40 PM EST -----  Glucose slightly elevated.  CMP otherwise unremarkable.  CBC unremarkable.  Lipids acceptable.  TSH unremarkable.  ----- Message -----  From: Lab, Background User  Sent: 2/22/2023   2:09 PM EST  To: DUANE Palencia

## 2023-02-28 DIAGNOSIS — R42 DIZZINESS: ICD-10-CM

## 2023-02-28 DIAGNOSIS — R06.02 SHORTNESS OF BREATH: ICD-10-CM

## 2023-02-28 DIAGNOSIS — R55 PRE-SYNCOPE: ICD-10-CM

## 2023-02-28 DIAGNOSIS — R00.2 PALPITATIONS: ICD-10-CM

## 2023-03-02 ENCOUNTER — TELEPHONE (OUTPATIENT)
Dept: CARDIOLOGY | Facility: CLINIC | Age: 48
End: 2023-03-02
Payer: COMMERCIAL

## 2023-03-02 NOTE — TELEPHONE ENCOUNTER
MONITOR  Pt notified of no acute findings. Provider will discuss results at f/u. Pt reminded of appt date and time.  ----- Message from Payton Mccoy MA sent at 3/2/2023  8:12 AM EST -----    ----- Message -----  From: Valdez Nix PA  Sent: 3/1/2023   6:02 PM EST  To: Payton Mccoy MA    Routine follow-up  ----- Message -----  From: William Newton MD  Sent: 2/28/2023  11:06 PM EST  To: DUANE Palencia

## 2023-09-01 DIAGNOSIS — I10 ESSENTIAL HYPERTENSION: ICD-10-CM

## 2023-09-01 DIAGNOSIS — R00.2 PALPITATIONS: ICD-10-CM

## 2023-09-01 RX ORDER — METOPROLOL SUCCINATE 50 MG/1
TABLET, EXTENDED RELEASE ORAL
Qty: 90 TABLET | Refills: 3 | Status: SHIPPED | OUTPATIENT
Start: 2023-09-01

## 2023-11-08 DIAGNOSIS — R07.2 PRECORDIAL PAIN: ICD-10-CM

## 2023-11-08 RX ORDER — ASPIRIN 81 MG/1
TABLET, COATED ORAL
Qty: 90 TABLET | Refills: 3 | Status: SHIPPED | OUTPATIENT
Start: 2023-11-08

## 2024-02-08 ENCOUNTER — OFFICE VISIT (OUTPATIENT)
Dept: CARDIOLOGY | Facility: CLINIC | Age: 49
End: 2024-02-08
Payer: COMMERCIAL

## 2024-02-08 VITALS
BODY MASS INDEX: 40.9 KG/M2 | HEIGHT: 64 IN | DIASTOLIC BLOOD PRESSURE: 92 MMHG | SYSTOLIC BLOOD PRESSURE: 138 MMHG | HEART RATE: 81 BPM | WEIGHT: 239.6 LBS | OXYGEN SATURATION: 97 %

## 2024-02-08 DIAGNOSIS — I10 ESSENTIAL HYPERTENSION: ICD-10-CM

## 2024-02-08 DIAGNOSIS — R07.2 PRECORDIAL PAIN: Primary | ICD-10-CM

## 2024-02-08 DIAGNOSIS — R06.02 SHORTNESS OF BREATH: ICD-10-CM

## 2024-02-08 PROCEDURE — 99213 OFFICE O/P EST LOW 20 MIN: CPT | Performed by: PHYSICIAN ASSISTANT

## 2024-02-08 PROCEDURE — 93000 ELECTROCARDIOGRAM COMPLETE: CPT | Performed by: PHYSICIAN ASSISTANT

## 2024-02-08 RX ORDER — PILOCARPINE HYDROCHLORIDE 5 MG/1
5 TABLET, FILM COATED ORAL 3 TIMES DAILY
COMMUNITY
Start: 2023-10-20

## 2024-02-08 RX ORDER — UBIDECARENONE 75 MG
50 CAPSULE ORAL DAILY
COMMUNITY

## 2024-02-08 RX ORDER — METHOTREXATE 2.5 MG/1
10 TABLET ORAL WEEKLY
COMMUNITY

## 2024-02-08 RX ORDER — FOLIC ACID 1 MG/1
1 TABLET ORAL DAILY
COMMUNITY

## 2024-02-08 NOTE — PROGRESS NOTES
Problem list     Subjective   Ellie Hardy is a 48 y.o. female     Chief Complaint   Patient presents with    Follow-up     Yearly follow up     Problem list:  1.  Minor nonobstructive CAD per catheterization, 7/2019.  2.  Preserved systolic function.  3.  Hypertension  4.  Dyslipidemia  5.  SLE.  6.  Sjogren's.  7.  Obstructive sleep apnea.  HPI  The patient presents in the clinic today for routine evaluation and follow-up.  For the most part, the patient is done fairly well from general cardiovascular standpoint since last evaluation.  She presents today on her yearly evaluation.  She continues to follow with rheumatology because of history of SLE and Sjogren's.  Medications are being adjusted and she feels that she has responded favorably to that.  She has intermittent chest pain which she feels is likely related to fibromyalgia.  She has nothing which she could report is angina.  Dyspnea is at baseline and felt secondary to weight and deconditioning.  She feels that her fatigue is likely related to the same.  She has no PND nor orthopnea.  She has rare palpitations but no sustained dysrhythmic activity.  The patient has no further complaints.    Current Outpatient Medications on File Prior to Visit   Medication Sig Dispense Refill    cholecalciferol (VITAMIN D3) 1000 units tablet Take 2 tablets by mouth Daily.      folic acid (FOLVITE) 1 MG tablet Take 1 tablet by mouth Daily.      Hydroxychloroquine Sulfate 400 MG tablet Take 400 mg by mouth Daily.      lisinopril (PRINIVIL,ZESTRIL) 5 MG tablet Take 1 tablet by mouth Daily. 30 tablet 11    methotrexate 2.5 MG tablet Take 4 tablets by mouth 1 (One) Time Per Week.      metoprolol succinate XL (TOPROL-XL) 50 MG 24 hr tablet TAKE 1 TABLET DAILY 90 tablet 3    nitroglycerin (NITROSTAT) 0.4 MG SL tablet 1 under the tongue as needed for angina, may repeat q5mins for up three doses 90 tablet 3    pilocarpine (SALAGEN) 5 MG tablet Take 1 tablet by mouth 3 (Three)  Times a Day.      vitamin B-12 (CYANOCOBALAMIN) 100 MCG tablet Take 0.5 tablets by mouth Daily.      Aspirin Low Dose 81 MG EC tablet TAKE 1 TABLET DAILY (Patient not taking: Reported on 2/8/2024) 90 tablet 3    CloNIDine (CATAPRES) 0.1 MG tablet take 1 tablet by mouth three times a day if needed for HIGH 30 tablet 5    furosemide (LASIX) 20 MG tablet Take 1 tablet by mouth Daily As Needed.      potassium chloride 10 MEQ CR tablet Take 1 tablet by mouth As Needed (with Lasix). 90 tablet 3     No current facility-administered medications on file prior to visit.       Ibuprofen, Nsaids, Other, and Shellfish allergy    Past Medical History:   Diagnosis Date    Coronary artery disease 2016    Mild    COVID-19 vaccine administered 03/08/2021    2nd -04/05/2021 - Moderna - Booster 11/17/2021- Moderna     Hypertension     Sjogren-Ernestina syndrome     Sleep apnea     c-pap, followed by Dr. Loera       Social History     Socioeconomic History    Marital status:    Tobacco Use    Smoking status: Never    Smokeless tobacco: Never   Vaping Use    Vaping Use: Never used   Substance and Sexual Activity    Alcohol use: Never    Drug use: Never    Sexual activity: Yes     Partners: Male     Birth control/protection: Tubal ligation, Hysterectomy     Comment: Hysterectomy       Family History   Problem Relation Age of Onset    Charcot-Lexy-Tooth disease Mother     Stroke Mother     Fainting Mother     Heart attack Mother     Heart disease Mother         Heart Attack- 100% blockage required stent    Heart disease Father         Hardening of the ateries, aortic aneurism    Heart attack Father        Review of Systems   Constitutional:  Positive for fatigue and fever. Negative for chills and diaphoresis.   Eyes:  Positive for visual disturbance (blurred vision at times).   Respiratory:  Positive for apnea (wears c-pap) and cough. Negative for chest tightness, shortness of breath and wheezing.    Cardiovascular: Negative.   "Negative for chest pain, palpitations and leg swelling.   Gastrointestinal: Negative.  Negative for abdominal pain and blood in stool.   Endocrine: Negative.    Genitourinary: Negative.  Negative for hematuria.   Musculoskeletal:  Positive for arthralgias. Negative for back pain, myalgias, neck pain and neck stiffness.   Skin: Negative.  Negative for rash and wound.   Allergic/Immunologic: Positive for environmental allergies (mold grass and pollen) and food allergies (lobster).   Neurological:  Positive for headaches. Negative for dizziness, syncope, weakness, light-headedness and numbness.   Hematological:  Bruises/bleeds easily (bruises easily).   Psychiatric/Behavioral:  Positive for sleep disturbance.        Objective   Vitals:    02/08/24 1407 02/08/24 1418   BP: 139/94 138/92   Pulse: 66 81   SpO2: 97%    Weight: 109 kg (239 lb 9.6 oz)    Height: 162.6 cm (64\")       /92   Pulse 81   Ht 162.6 cm (64\")   Wt 109 kg (239 lb 9.6 oz)   SpO2 97%   BMI 41.13 kg/m²    Lab Results (most recent)       None          Physical Exam  Vitals and nursing note reviewed.   Constitutional:       General: She is not in acute distress.     Appearance: She is well-developed.   HENT:      Head: Normocephalic and atraumatic.   Eyes:      Conjunctiva/sclera: Conjunctivae normal.      Pupils: Pupils are equal, round, and reactive to light.   Neck:      Vascular: No JVD.      Trachea: No tracheal deviation.   Cardiovascular:      Rate and Rhythm: Normal rate and regular rhythm.      Heart sounds: Normal heart sounds.   Pulmonary:      Effort: Pulmonary effort is normal.      Breath sounds: Normal breath sounds.   Abdominal:      General: Bowel sounds are normal. There is no distension.      Palpations: Abdomen is soft. There is no mass.      Tenderness: There is no abdominal tenderness. There is no guarding or rebound.   Musculoskeletal:         General: No tenderness or deformity. Normal range of motion.      Cervical back: " Normal range of motion and neck supple.   Skin:     General: Skin is warm and dry.      Coloration: Skin is not pale.      Findings: No erythema or rash.   Neurological:      Mental Status: She is alert and oriented to person, place, and time.   Psychiatric:         Behavior: Behavior normal.         Thought Content: Thought content normal.         Judgment: Judgment normal.           Procedure     ECG 12 Lead    Date/Time: 2/8/2024 2:16 PM  Performed by: Valdez Nix PA    Authorized by: Valdez Nix PA  Comparison: compared with previous ECG from 9/29/2021  Comparison to previous ECG: Sinus rhythm, rate 90, normal axis, no acute changes noted.             Assessment & Plan      Diagnosis Plan   1. Precordial pain        2. Shortness of breath        3. Essential hypertension            1.  The patient presents in yearly follow-up.  She has continued to do well for the most part.  She describes mostly atypical chest pain.  Dyspnea is at baseline.  She has no further cardiovascular symptoms or complaints of significance.    2.  Stress test and echo studies from approximately 2 years ago were again reviewed in detail with the patient.  Stress test indicated no evidence of ischemia.  Echo supported normal systolic function, no pericardial effusion, no significant valvular or structural abnormalities.  Will discuss repeating the studies at her next appointment, at her request.  I feel she is doing well enough for now that we can hold on that.    3.  Blood pressures have been mostly normotensive to hypotensive recently.  She has had rather severe symptomatic hypotension issues intermittently.  She will discontinue lisinopril and continue metoprolol.  I feel she benefits with regards to tachycardia and palpitations from metoprolol therapy.  I would not adjust that.    4.  Nothing further unless clinical course should change.  I feel she is very much stable.  We will continue to see her on 6 to 12-month  intervals.                 Electronically signed by:

## 2024-04-19 ENCOUNTER — TELEPHONE (OUTPATIENT)
Dept: CARDIOLOGY | Facility: CLINIC | Age: 49
End: 2024-04-19
Payer: COMMERCIAL

## 2024-04-19 DIAGNOSIS — R00.2 PALPITATIONS: ICD-10-CM

## 2024-04-19 DIAGNOSIS — R42 DIZZINESS: ICD-10-CM

## 2024-04-19 DIAGNOSIS — R06.02 SHORTNESS OF BREATH: ICD-10-CM

## 2024-04-19 DIAGNOSIS — R55 PRE-SYNCOPE: ICD-10-CM

## 2024-04-19 RX ORDER — LISINOPRIL 5 MG/1
5 TABLET ORAL DAILY
Qty: 30 TABLET | Refills: 11 | Status: SHIPPED | OUTPATIENT
Start: 2024-04-19 | End: 2024-04-22 | Stop reason: SDUPTHER

## 2024-04-22 DIAGNOSIS — R55 PRE-SYNCOPE: ICD-10-CM

## 2024-04-22 DIAGNOSIS — R06.02 SHORTNESS OF BREATH: ICD-10-CM

## 2024-04-22 DIAGNOSIS — R00.2 PALPITATIONS: ICD-10-CM

## 2024-04-22 DIAGNOSIS — R42 DIZZINESS: ICD-10-CM

## 2024-04-22 RX ORDER — LISINOPRIL 5 MG/1
5 TABLET ORAL DAILY
Qty: 90 TABLET | Refills: 3 | Status: SHIPPED | OUTPATIENT
Start: 2024-04-22

## 2024-09-19 LAB
MAXIMAL PREDICTED HEART RATE: 177 BPM
STRESS TARGET HR: 150 BPM

## 2025-02-10 ENCOUNTER — OFFICE VISIT (OUTPATIENT)
Dept: CARDIOLOGY | Facility: CLINIC | Age: 50
End: 2025-02-10
Payer: COMMERCIAL

## 2025-02-10 VITALS
HEIGHT: 64 IN | WEIGHT: 250.4 LBS | HEART RATE: 80 BPM | SYSTOLIC BLOOD PRESSURE: 112 MMHG | OXYGEN SATURATION: 98 % | BODY MASS INDEX: 42.75 KG/M2 | DIASTOLIC BLOOD PRESSURE: 80 MMHG

## 2025-02-10 DIAGNOSIS — I10 ESSENTIAL HYPERTENSION: ICD-10-CM

## 2025-02-10 DIAGNOSIS — R06.02 SHORTNESS OF BREATH: Primary | ICD-10-CM

## 2025-02-10 DIAGNOSIS — R00.2 PALPITATIONS: ICD-10-CM

## 2025-02-10 PROCEDURE — 99213 OFFICE O/P EST LOW 20 MIN: CPT | Performed by: PHYSICIAN ASSISTANT

## 2025-02-10 PROCEDURE — 93000 ELECTROCARDIOGRAM COMPLETE: CPT | Performed by: PHYSICIAN ASSISTANT

## 2025-02-10 RX ORDER — CEVIMELINE HYDROCHLORIDE 30 MG/1
30 CAPSULE ORAL EVERY 8 HOURS
COMMUNITY
Start: 2025-01-14

## 2025-02-10 RX ORDER — GUANFACINE 1 MG/1
0.5 TABLET ORAL DAILY
COMMUNITY
Start: 2025-01-30 | End: 2026-01-31

## 2025-02-10 RX ORDER — METHOTREXATE 25 MG/ML
50 INJECTION, SOLUTION INTRAMUSCULAR; INTRATHECAL; INTRAVENOUS; SUBCUTANEOUS ONCE
COMMUNITY
Start: 2024-12-12

## 2025-02-10 NOTE — PROGRESS NOTES
Problem list     Subjective   Ellie Hardy is a 49 y.o. female     Chief Complaint   Patient presents with    Follow-up     Yearly follow up    Problem list:  1.  Minor nonobstructive CAD per catheterization, 7/2019.  2.  Preserved systolic function.  3.  Hypertension  4.  Dyslipidemia  5.  SLE.  6.  Sjogren's.  7.  Obstructive sleep apnea.    HPI  The patient presents in clinic today for routine evaluation and follow-up.  For the most part, she is continued to do well from cardiovascular standpoint since last evaluation.  She currently denies angina.  Dyspnea is at baseline.  She is being evaluated for autoimmune disorders.  She is anxiously awaiting that evaluation.  She denies PND nor orthopnea.  To her knowledge, she is typically normotensive.  She has no further complaints and feels that she is doing well.    Current Outpatient Medications on File Prior to Visit   Medication Sig Dispense Refill    cevimeline (EVOXAC) 30 MG capsule Take 1 capsule by mouth Every 8 (Eight) Hours.      cholecalciferol (VITAMIN D3) 1000 units tablet Take 2 tablets by mouth Daily.      CloNIDine (CATAPRES) 0.1 MG tablet take 1 tablet by mouth three times a day if needed for HIGH 30 tablet 5    folic acid (FOLVITE) 1 MG tablet Take 1 tablet by mouth Daily.      furosemide (LASIX) 20 MG tablet Take 1 tablet by mouth Daily As Needed.      guanFACINE (TENEX) 1 MG tablet Take 0.5 tablets by mouth Daily.      Hydroxychloroquine Sulfate 400 MG tablet Take 400 mg by mouth Daily.      lisinopril (PRINIVIL,ZESTRIL) 5 MG tablet Take 1 tablet by mouth Daily. 90 tablet 3    Methotrexate Sodium 50 MG/2ML injection Infuse 2 mL into a venous catheter 1 (One) Time.      nitroglycerin (NITROSTAT) 0.4 MG SL tablet 1 under the tongue as needed for angina, may repeat q5mins for up three doses 90 tablet 3    potassium chloride 10 MEQ CR tablet Take 1 tablet by mouth As Needed (with Lasix). 90 tablet 3    vitamin B-12 (CYANOCOBALAMIN) 100 MCG tablet  Take 0.5 tablets by mouth Daily.       No current facility-administered medications on file prior to visit.       Ibuprofen, Nsaids, Other, and Shellfish allergy    Past Medical History:   Diagnosis Date    Coronary artery disease 2016    Mild    COVID-19 vaccine administered 03/08/2021    2nd -04/05/2021 - Moderna - Booster 11/17/2021- Moderna     Hypertension     POTS (postural orthostatic tachycardia syndrome)     ISO SLE/SJOGRENS    Sjogren-Ernestina syndrome     Sleep apnea     c-pap, followed by Dr. Loera       Social History     Socioeconomic History    Marital status:    Tobacco Use    Smoking status: Never    Smokeless tobacco: Never   Vaping Use    Vaping status: Never Used   Substance and Sexual Activity    Alcohol use: Never    Drug use: Never    Sexual activity: Yes     Partners: Male     Birth control/protection: Tubal ligation, Hysterectomy     Comment: Hysterectomy       Family History   Problem Relation Age of Onset    Charcot-Lexy-Tooth disease Mother     Stroke Mother     Fainting Mother     Heart attack Mother     Heart disease Mother         Heart Attack- 100% blockage required stent    Heart disease Father         Hardening of the ateries, aortic aneurism    Heart attack Father        Review of Systems   Constitutional:  Negative for chills, diaphoresis, fatigue and fever.   HENT: Negative.  Negative for hearing loss.    Eyes: Negative.  Negative for visual disturbance.   Respiratory:  Positive for apnea. Negative for cough, chest tightness, shortness of breath and wheezing.    Cardiovascular: Negative.  Negative for chest pain, palpitations and leg swelling.   Gastrointestinal: Negative.  Negative for abdominal pain and blood in stool.   Endocrine: Negative.    Genitourinary: Negative.  Negative for hematuria.   Musculoskeletal:  Positive for arthralgias, back pain, neck pain and neck stiffness. Negative for myalgias.   Skin: Negative.  Negative for rash and wound.  "  Allergic/Immunologic: Positive for food allergies (SHELL FISH). Negative for environmental allergies.   Neurological:  Positive for weakness and light-headedness. Negative for dizziness, syncope, numbness and headaches.   Hematological:  Bruises/bleeds easily.   Psychiatric/Behavioral:  Positive for sleep disturbance (SLEEP APNEA).        Objective   Vitals:    02/10/25 1515   BP: 112/80   Pulse: 80   SpO2: 98%   Weight: 114 kg (250 lb 6.4 oz)   Height: 162.6 cm (64\")      /80   Pulse 80   Ht 162.6 cm (64\")   Wt 114 kg (250 lb 6.4 oz)   SpO2 98%   BMI 42.98 kg/m²    Lab Results (most recent)       None          Physical Exam  Vitals and nursing note reviewed.   Constitutional:       General: She is not in acute distress.     Appearance: She is well-developed.   HENT:      Head: Normocephalic and atraumatic.   Eyes:      Conjunctiva/sclera: Conjunctivae normal.      Pupils: Pupils are equal, round, and reactive to light.   Neck:      Vascular: No JVD.      Trachea: No tracheal deviation.   Cardiovascular:      Rate and Rhythm: Normal rate and regular rhythm.      Heart sounds: Normal heart sounds.   Pulmonary:      Effort: Pulmonary effort is normal.      Breath sounds: Normal breath sounds.   Abdominal:      General: Bowel sounds are normal. There is no distension.      Palpations: Abdomen is soft. There is no mass.      Tenderness: There is no abdominal tenderness. There is no guarding or rebound.   Musculoskeletal:         General: No tenderness or deformity. Normal range of motion.      Cervical back: Normal range of motion and neck supple.   Skin:     General: Skin is warm and dry.      Coloration: Skin is not pale.      Findings: No erythema or rash.   Neurological:      Mental Status: She is alert and oriented to person, place, and time.   Psychiatric:         Behavior: Behavior normal.         Thought Content: Thought content normal.         Judgment: Judgment normal.           Procedure     ECG " 12 Lead    Date/Time: 2/10/2025 3:36 PM  Performed by: Valdez Nix PA    Authorized by: Valdez Nix PA  Comparison: compared with previous ECG from 2/8/2024             Assessment & Plan      Diagnosis Plan   1. Shortness of breath        2. Essential hypertension        3. Palpitations          1.  At this time, the patient appears to be doing well.  Dyspnea is at baseline.  Palpitations have minimized.  She no longer experiences chest pain.    2.  Prior catheterization was unremarkable as above.  She is doing well.  I do not feel further evaluation is indicated.    3.  We will make no adjustments to medications and continue to see her on a yearly evaluation.                 Electronically signed by:

## 2025-03-18 ENCOUNTER — TELEPHONE (OUTPATIENT)
Dept: CARDIOLOGY | Facility: CLINIC | Age: 50
End: 2025-03-18
Payer: COMMERCIAL

## 2025-03-18 DIAGNOSIS — R06.02 SHORTNESS OF BREATH: Primary | ICD-10-CM

## 2025-03-18 NOTE — TELEPHONE ENCOUNTER
Caller: Ellie Hardy    Relationship: Self    Best call back number: [phone]    What is the best time to reach you: ANY    What was the call regarding: PATIENT WAS UNDER THE ASSUMPTION YAJAIRA LANDIS WANTED ANOTHER ECHO PERFORMED. PLEASE CALL HER TO DISCUSS/ADVISE.     Is it okay if the provider responds through MyChart: NO

## 2025-04-18 ENCOUNTER — HOSPITAL ENCOUNTER (OUTPATIENT)
Dept: CARDIOLOGY | Facility: HOSPITAL | Age: 50
Discharge: HOME | End: 2025-04-18
Admitting: PHYSICIAN ASSISTANT
Payer: COMMERCIAL

## 2025-04-18 DIAGNOSIS — R06.02 SHORTNESS OF BREATH: ICD-10-CM

## 2025-04-18 PROCEDURE — 93306 TTE W/DOPPLER COMPLETE: CPT

## 2025-04-20 LAB
AV MEAN PRESS GRAD SYS DOP V1V2: 2.6 MMHG
AV VMAX SYS DOP: 97.7 CM/SEC
BH CV ECHO MEAS - ACS: 2.4 CM
BH CV ECHO MEAS - AO MAX PG: 3.8 MMHG
BH CV ECHO MEAS - AO ROOT DIAM: 3.4 CM
BH CV ECHO MEAS - AO V2 VTI: 23 CM
BH CV ECHO MEAS - EDV(CUBED): 39.7 ML
BH CV ECHO MEAS - EDV(MOD-SP4): 71.2 ML
BH CV ECHO MEAS - EF(MOD-SP4): 56.3 %
BH CV ECHO MEAS - ESV(CUBED): 5.5 ML
BH CV ECHO MEAS - ESV(MOD-SP4): 31.1 ML
BH CV ECHO MEAS - FS: 48.1 %
BH CV ECHO MEAS - IVS/LVPW: 0.99 CM
BH CV ECHO MEAS - IVSD: 1.21 CM
BH CV ECHO MEAS - LA DIMENSION: 4 CM
BH CV ECHO MEAS - LAT PEAK E' VEL: 8.9 CM/SEC
BH CV ECHO MEAS - LV DIASTOLIC VOL/BSA (35-75): 33.1 CM2
BH CV ECHO MEAS - LV MASS(C)D: 133.3 GRAMS
BH CV ECHO MEAS - LV SYSTOLIC VOL/BSA (12-30): 14.5 CM2
BH CV ECHO MEAS - LVIDD: 3.4 CM
BH CV ECHO MEAS - LVIDS: 1.77 CM
BH CV ECHO MEAS - LVPWD: 1.22 CM
BH CV ECHO MEAS - MED PEAK E' VEL: 5.4 CM/SEC
BH CV ECHO MEAS - MV A MAX VEL: 62.1 CM/SEC
BH CV ECHO MEAS - MV DEC TIME: 0.2 SEC
BH CV ECHO MEAS - MV E MAX VEL: 75.4 CM/SEC
BH CV ECHO MEAS - MV E/A: 1.21
BH CV ECHO MEAS - RVDD: 3.2 CM
BH CV ECHO MEAS - SV(MOD-SP4): 40.1 ML
BH CV ECHO MEAS - SVI(MOD-SP4): 18.6 ML/M2
BH CV ECHO MEASUREMENTS AVERAGE E/E' RATIO: 10.55
IVRT: 106 MS
LEFT ATRIUM VOLUME INDEX: 14.8 ML/M2

## 2025-04-21 ENCOUNTER — RESULTS FOLLOW-UP (OUTPATIENT)
Dept: CARDIOLOGY | Facility: CLINIC | Age: 50
End: 2025-04-21
Payer: COMMERCIAL

## 2025-04-21 NOTE — TELEPHONE ENCOUNTER
ECHO  Pt notified of no acute findings. Provider will discuss results at f/u. Pt reminded of appt date and time.  ----- Message from Valdez Nix sent at 4/21/2025 12:22 AM EDT -----  Routine follow-up.  ----- Message -----  From: William Newton MD  Sent: 4/20/2025   8:02 PM EDT  To: DUANE Palencia

## 2025-05-19 DIAGNOSIS — R00.2 PALPITATIONS: ICD-10-CM

## 2025-05-19 DIAGNOSIS — R06.02 SHORTNESS OF BREATH: ICD-10-CM

## 2025-05-19 DIAGNOSIS — R42 DIZZINESS: ICD-10-CM

## 2025-05-19 DIAGNOSIS — R55 PRE-SYNCOPE: ICD-10-CM

## 2025-05-19 RX ORDER — LISINOPRIL 5 MG/1
5 TABLET ORAL DAILY
Qty: 90 TABLET | Refills: 3 | Status: SHIPPED | OUTPATIENT
Start: 2025-05-19